# Patient Record
Sex: FEMALE | Race: WHITE
[De-identification: names, ages, dates, MRNs, and addresses within clinical notes are randomized per-mention and may not be internally consistent; named-entity substitution may affect disease eponyms.]

---
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
## 2023-01-05 ENCOUNTER — OFFICE VISIT (OUTPATIENT)
Dept: INTERNAL MEDICINE | Facility: CLINIC | Age: 18
End: 2023-01-05
Payer: COMMERCIAL

## 2023-01-05 ENCOUNTER — LAB (OUTPATIENT)
Dept: LAB | Facility: HOSPITAL | Age: 18
End: 2023-01-05
Payer: COMMERCIAL

## 2023-01-05 VITALS
SYSTOLIC BLOOD PRESSURE: 110 MMHG | WEIGHT: 120 LBS | DIASTOLIC BLOOD PRESSURE: 68 MMHG | RESPIRATION RATE: 16 BRPM | BODY MASS INDEX: 19.29 KG/M2 | TEMPERATURE: 97.2 F | HEART RATE: 80 BPM | HEIGHT: 66 IN | OXYGEN SATURATION: 99 %

## 2023-01-05 DIAGNOSIS — E55.9 VITAMIN D DEFICIENCY: ICD-10-CM

## 2023-01-05 DIAGNOSIS — N92.6 MISSED PERIOD: ICD-10-CM

## 2023-01-05 DIAGNOSIS — F41.9 ANXIETY: ICD-10-CM

## 2023-01-05 DIAGNOSIS — Z00.00 ANNUAL PHYSICAL EXAM: Primary | ICD-10-CM

## 2023-01-05 DIAGNOSIS — Z00.00 ANNUAL PHYSICAL EXAM: ICD-10-CM

## 2023-01-05 LAB
DEPRECATED RDW RBC AUTO: 40.2 FL (ref 37–54)
ERYTHROCYTE [DISTWIDTH] IN BLOOD BY AUTOMATED COUNT: 12.3 % (ref 12.3–15.4)
HCT VFR BLD AUTO: 41.7 % (ref 34–46.6)
HGB BLD-MCNC: 13.9 G/DL (ref 12–15.9)
MCH RBC QN AUTO: 29.8 PG (ref 26.6–33)
MCHC RBC AUTO-ENTMCNC: 33.3 G/DL (ref 31.5–35.7)
MCV RBC AUTO: 89.5 FL (ref 79–97)
PLATELET # BLD AUTO: 181 10*3/MM3 (ref 140–450)
PMV BLD AUTO: 10.8 FL (ref 6–12)
RBC # BLD AUTO: 4.66 10*6/MM3 (ref 3.77–5.28)
WBC NRBC COR # BLD: 5.88 10*3/MM3 (ref 3.4–10.8)

## 2023-01-05 PROCEDURE — 84443 ASSAY THYROID STIM HORMONE: CPT

## 2023-01-05 PROCEDURE — 2014F MENTAL STATUS ASSESS: CPT | Performed by: NURSE PRACTITIONER

## 2023-01-05 PROCEDURE — 80053 COMPREHEN METABOLIC PANEL: CPT

## 2023-01-05 PROCEDURE — 80061 LIPID PANEL: CPT

## 2023-01-05 PROCEDURE — 84702 CHORIONIC GONADOTROPIN TEST: CPT

## 2023-01-05 PROCEDURE — 36415 COLL VENOUS BLD VENIPUNCTURE: CPT

## 2023-01-05 PROCEDURE — 99395 PREV VISIT EST AGE 18-39: CPT | Performed by: NURSE PRACTITIONER

## 2023-01-05 PROCEDURE — 3008F BODY MASS INDEX DOCD: CPT | Performed by: NURSE PRACTITIONER

## 2023-01-05 PROCEDURE — 82306 VITAMIN D 25 HYDROXY: CPT

## 2023-01-05 PROCEDURE — 85027 COMPLETE CBC AUTOMATED: CPT

## 2023-01-05 NOTE — PROGRESS NOTES
Female Physical Note      Date: 2023   Patient Name: Reina Torres  : 2005   MRN: [id number]     Chief Complaint:    Chief Complaint   Patient presents with   • Annual Exam       History of Present Illness: Reina Torres is a 17 y.o. female who is here today for their annual health maintenance and physical.       Subjective      Annual exam  Reports having an IUD.   Denies taking vitamin D supplements due to an issue with her pharmacy.   She also has an issue with getting her birth control from the pharmacy.   Requesting a pregnancy test due to her prescription issues.   She is trying to scheduled an appointment with OBGYN, Dr. Cheryl Wheeler.   Last eye exam was 2 months ago and dental exam was 3 months ago.   She had her braces removed.   Drinks occasionally.   Denies smoking, vaping or illicit drug use.   Ambulates at work for physical activity.   Denies eating a healthy diet.   Recently joined [employer].   Denies any sexual health concern.   Has a family history of cervical cancer, PCOS and endometriosis.     Anxiety  Takes Zoloft 50 mg and tolerates it well.   Requesting refill for Zoloft 50 mg.   Denies little interest in doing things in the last 2 weeks.   She sleeps all the time and unsure if it is due to her medications.     Immunizations  She is due for the covid, flu, and HPV vaccine.       Review of Systems:   Review of Systems   Constitutional: Negative for chills, fatigue and fever.   HENT: Negative for postnasal drip.    Respiratory: Negative for cough, shortness of breath and wheezing.    Cardiovascular: Negative for chest pain.   Gastrointestinal: Negative for abdominal pain, diarrhea, nausea and vomiting.   Endocrine: Negative for cold intolerance and heat intolerance.   Genitourinary: Negative for dysuria.   Musculoskeletal: Negative for arthralgias and myalgias.   Skin: Negative for rash and bruise.   Neurological: Negative for headache.   Hematological: Negative for  adenopathy. Does not bruise/bleed easily.   Psychiatric/Behavioral: Negative for depressed mood.       Past Medical History, Social History, Family History and Care Team were all reviewed with patient and updated as appropriate.     Medications:     Current Outpatient Medications:   •  hydrOXYzine pamoate (Vistaril) 25 MG capsule, Take 1 capsule by mouth At Night As Needed (insomnia)., Disp: 30 capsule, Rfl: 2  •  naproxen (NAPROSYN) 500 MG tablet, Take 1 tablet by mouth Every 12 (Twelve) Hours As Needed for Moderate Pain., Disp: 180 tablet, Rfl: 0  •  sertraline (ZOLOFT) 50 MG tablet, Take 1 tablet by mouth Daily. May be administered without regard to food; administer once daily dosage either in morning or evening., Disp: 90 tablet, Rfl: 3  •  vitamin D (ERGOCALCIFEROL) 1.25 MG (58369 UT) capsule capsule, Take 1 capsule by mouth 1 (One) Time Per Week., Disp: 12 capsule, Rfl: 0  •  Xulane 150-35 MCG/24HR, APPLY 1 PATCH EACH WEEK FOR 3 WEEKS (21 TOTAL DAYS), FOLLOWED BY ONE WEEK PATCH-FREE, Disp: 3 patch, Rfl: 0    Allergies:   No Known Allergies    Immunizations:  Td/Tdap(Booster Q 10 yrs):    Flu (Yearly):    Pneumovax (1 yr after Prevnar):    Macfnfr75 (1 yr after Pneumo):    Colorectal Screening:     Last Completed Colonoscopy     This patient has no relevant Health Maintenance data.         Pap:    Last Completed Pap Smear     This patient has no relevant Health Maintenance data.         Mammogram:    Last Completed Mammogram     This patient has no relevant Health Maintenance data.           CT for Smoker (Age 55-75, 30pk yr):    Bone Density/DEXA:   Hep C ( 6876-3023):      Diet/Physical activity:    Sexual Health:     PHQ-2 Depression Screening  Little interest or pleasure in doing things? 0-->not at all   Feeling down, depressed, or hopeless? 0-->not at all   PHQ-2 Total Score 0        Intimate partner violence: (Screen on initial visit, pregnant women, women with injuries, older adult with injury or  evidence of neglect):  • Violence can be a problem in many people's lives, so I now ask every patient about trauma or abuse they may have experienced in a relationship.  • Stress/Safety - Do you feel safe in your relationship?  • Afraid/Abused - Have you ever been in a relationship where you were threatened, hurt, or afraid?  • Friend/Family - Are your friends aware you have been hurt?  • Emergency Plan - Do you have a safe place to go and the resources you need in an emergency?    Osteoporosis:   • Ost menopausal women < 65 with RF (advancing age, previous fracture, glucocorticoid therapy, parental hip fracture, low body weight, current cigarette smoking, excessive alcohol consumption, rheumatoid arthritis, secondary osteoporosis [hypogonadism/premature menopause, malabsorption, chronic liver disease, IBD]).  • All women 65 or older    Objective     Physical Exam:  Vital Signs:   Vitals:    01/05/23 1053   BP: 110/68   Pulse: 80   Resp: 16   Temp: 97.2 °F (36.2 °C)   SpO2: 99%   Weight: 54.4 kg (120 lb)   Height: 167.6 cm (66\")   PainSc: 0-No pain     Body mass index is 19.37 kg/m².     Physical Exam  Vitals and nursing note reviewed.   Constitutional:       General: She is awake. She is not in acute distress.     Appearance: Normal appearance. She is well-developed and normal weight. She is not ill-appearing or diaphoretic.   HENT:      Head: Normocephalic and atraumatic.      Right Ear: Tympanic membrane, ear canal and external ear normal.      Left Ear: Tympanic membrane, ear canal and external ear normal.      Nose: Nose normal.      Mouth/Throat:      Mouth: Mucous membranes are moist.      Pharynx: Oropharynx is clear. No oropharyngeal exudate or posterior oropharyngeal erythema.   Eyes:      Extraocular Movements: Extraocular movements intact.      Conjunctiva/sclera: Conjunctivae normal.      Pupils: Pupils are equal, round, and reactive to light.   Neck:      Thyroid: No thyroid mass, thyromegaly or  thyroid tenderness.      Vascular: No JVD.   Cardiovascular:      Rate and Rhythm: Normal rate and regular rhythm.      Pulses: Normal pulses.      Heart sounds: Normal heart sounds.     No S3 or S4 sounds.   Pulmonary:      Effort: Pulmonary effort is normal. No respiratory distress.      Breath sounds: Normal breath sounds and air entry.   Abdominal:      General: Abdomen is flat. Bowel sounds are normal.      Palpations: Abdomen is soft.      Tenderness: There is no abdominal tenderness.   Musculoskeletal:         General: No swelling.      Cervical back: Normal range of motion and neck supple.      Right lower leg: No edema.      Left lower leg: No edema.   Lymphadenopathy:      Cervical: No cervical adenopathy.   Skin:     General: Skin is warm and dry.      Capillary Refill: Capillary refill takes less than 2 seconds.   Neurological:      General: No focal deficit present.      Mental Status: She is alert and oriented to person, place, and time. Mental status is at baseline.      Motor: No weakness.   Psychiatric:         Attention and Perception: Attention and perception normal.         Mood and Affect: Mood and affect normal.         Speech: Speech normal.         Behavior: Behavior normal.         Thought Content: Thought content normal.         Judgment: Judgment normal.             Assessment / Plan      Assessment/Plan:   Diagnoses and all orders for this visit:    1. Annual physical exam (Primary)  Comments:  Routine labs will be obtained.   HPV vaccine will be given in the office today  -Advice and education given regarding routine dental evaluations, routine eye exams, reproductive health, cardiovascular risk reduction, sunscreen use, and seatbelt use (generall overall safety). Further recommendations after lab evaluation. Annual wellness evaluations recommended.   -Recommended vaccines, but specifically encouraged HPV vaccine as this protects against cervical cancer  Orders:  -     CBC (No Diff);  Future  -     Comprehensive Metabolic Panel; Future  -     Lipid Panel; Future  -     TSH Rfx On Abnormal To Free T4; Future  -     Vitamin D,25-Hydroxy; Future  -     hCG, Quantitative, Pregnancy; Future  -     HPV Vaccine  -     Ambulatory Referral to Obstetrics / Gynecology    2. Anxiety  Comments:  Zoloft 50 mg will be refilled.     Orders:  -     sertraline (ZOLOFT) 50 MG tablet; Take 1 tablet by mouth Daily. May be administered without regard to food; administer once daily dosage either in morning or evening.  Dispense: 90 tablet; Refill: 3    3. Missed period  -     hCG, Quantitative, Pregnancy; Future    4. Vitamin D deficiency  -     Vitamin D,25-Hydroxy; Future         Follow Up:   Return in about 1 year (around 1/5/2024) for Annual.  -2nd HPV vaccine in 1-2m (Feb/Mar), 3rd HPV vaccine in 6m (July)      Reina Torres voices understanding and acceptance of this advice and will call back with any further questions or concerns. AVS with preventive healthcare tips printed for patient.     ESME Lynch  [facility]     Transcribed from ambient dictation for ESME Moss by Ray Morales.  01/05/23   12:41 EST    Patient or patient representative verbalized consent to the visit recording.  I have personally performed the services described in this document as transcribed by the above individual, and it is both accurate and complete.

## 2023-01-05 NOTE — PROGRESS NOTES
Female Physical Note      Date: 2023   Patient Name: Reina Torres  : 2005   MRN: [id number]     Chief Complaint:    Chief Complaint   Patient presents with   • Annual Exam       History of Present Illness: Reina Torres is a 17 y.o. female who is here today for their annual health maintenance and physical. ***      Subjective      Review of Systems:   Review of Systems    Past Medical History, Social History, Family History and Care Team were all reviewed with patient and updated as appropriate.     Medications:     Current Outpatient Medications:   •  hydrOXYzine pamoate (Vistaril) 25 MG capsule, Take 1 capsule by mouth At Night As Needed (insomnia)., Disp: 30 capsule, Rfl: 2  •  naproxen (NAPROSYN) 500 MG tablet, Take 1 tablet by mouth Every 12 (Twelve) Hours As Needed for Moderate Pain., Disp: 180 tablet, Rfl: 0  •  sertraline (ZOLOFT) 50 MG tablet, Take 1 tablet by mouth Daily. May be administered without regard to food; administer once daily dosage either in morning or evening., Disp: 90 tablet, Rfl: 3  •  vitamin D (ERGOCALCIFEROL) 1.25 MG (56984 UT) capsule capsule, Take 1 capsule by mouth 1 (One) Time Per Week., Disp: 12 capsule, Rfl: 0  •  Xulane 150-35 MCG/24HR, APPLY 1 PATCH EACH WEEK FOR 3 WEEKS (21 TOTAL DAYS), FOLLOWED BY ONE WEEK PATCH-FREE, Disp: 3 patch, Rfl: 0    Allergies:   No Known Allergies    Immunizations:  Td/Tdap(Booster Q 10 yrs):    Flu (Yearly):    Pneumovax (1 yr after Prevnar):    Wizhxyd72 (1 yr after Pneumo):    Colorectal Screening:     Last Completed Colonoscopy     This patient has no relevant Health Maintenance data.         Pap:    Last Completed Pap Smear     This patient has no relevant Health Maintenance data.         Mammogram:    Last Completed Mammogram     This patient has no relevant Health Maintenance data.           CT for Smoker (Age 55-75, 30pk yr):    Bone Density/DEXA:   Hep C ( [address]55):      Diet/Physical activity:    Sexual Health:      PHQ-2 Depression Screening  Little interest or pleasure in doing things?     Feeling down, depressed, or hopeless?     PHQ-2 Total Score          Intimate partner violence: (Screen on initial visit, pregnant women, women with injuries, older adult with injury or evidence of neglect):  • Violence can be a problem in many people's lives, so I now ask every patient about trauma or abuse they may have experienced in a relationship.  • Stress/Safety - Do you feel safe in your relationship?  • Afraid/Abused - Have you ever been in a relationship where you were threatened, hurt, or afraid?  • Friend/Family - Are your friends aware you have been hurt?  • Emergency Plan - Do you have a safe place to go and the resources you need in an emergency?    Osteoporosis:   • Ost menopausal women < 65 with RF (advancing age, previous fracture, glucocorticoid therapy, parental hip fracture, low body weight, current cigarette smoking, excessive alcohol consumption, rheumatoid arthritis, secondary osteoporosis [hypogonadism/premature menopause, malabsorption, chronic liver disease, IBD]).  • All women 65 or older    Objective     Physical Exam:  Vital Signs:   Vitals:    01/05/23 1053   BP: 110/68   Pulse: 80   Resp: 16   Temp: 97.2 °F (36.2 °C)   SpO2: 99%   Weight: 54.4 kg (120 lb)   Height: 167.6 cm (66\")   PainSc: 0-No pain     Body mass index is 19.37 kg/m².     Physical Exam    Procedures    Assessment / Plan      Assessment/Plan:   Diagnoses and all orders for this visit:    1. Annual physical exam (Primary)  -     CBC (No Diff); Future  -     Comprehensive Metabolic Panel; Future  -     Lipid Panel; Future  -     TSH Rfx On Abnormal To Free T4; Future  -     Vitamin D,25-Hydroxy; Future  -     hCG, Quantitative, Pregnancy; Future    2. Anxiety  -     sertraline (ZOLOFT) 50 MG tablet; Take 1 tablet by mouth Daily. May be administered without regard to food; administer once daily dosage either in morning or evening.  Dispense:  90 tablet; Refill: 3    3. Missed period  -     hCG, Quantitative, Pregnancy; Future    4. Vitamin D deficiency  -     Vitamin D,25-Hydroxy; Future         Follow Up:   No follow-ups on file.    Healthcare Maintenance:   Counseling provided on ***.   Reina Torres voices understanding and acceptance of this advice and will call back with any further questions or concerns. AVS with preventive healthcare tips printed for patient.     ESME Lynch  [facility] [place]

## 2023-01-06 LAB
25(OH)D3 SERPL-MCNC: 18 NG/ML (ref 30–100)
ALBUMIN SERPL-MCNC: 4.2 G/DL (ref 3.5–5.2)
ALBUMIN/GLOB SERPL: 1.4 G/DL
ALP SERPL-CCNC: 58 U/L (ref 39–117)
ALT SERPL W P-5'-P-CCNC: 9 U/L (ref 1–33)
ANION GAP SERPL CALCULATED.3IONS-SCNC: 7.4 MMOL/L (ref 5–15)
AST SERPL-CCNC: 19 U/L (ref 1–32)
BILIRUB SERPL-MCNC: 0.2 MG/DL (ref 0–1.2)
BUN SERPL-MCNC: 13 MG/DL (ref 6–20)
BUN/CREAT SERPL: 20.3 (ref 7–25)
CALCIUM SPEC-SCNC: 9.1 MG/DL (ref 8.6–10.5)
CHLORIDE SERPL-SCNC: 103 MMOL/L (ref 98–107)
CHOLEST SERPL-MCNC: 157 MG/DL (ref 0–200)
CO2 SERPL-SCNC: 25.6 MMOL/L (ref 22–29)
CREAT SERPL-MCNC: 0.64 MG/DL (ref 0.57–1)
EGFRCR SERPLBLD CKD-EPI 2021: 130.7 ML/MIN/1.73
GLOBULIN UR ELPH-MCNC: 3 GM/DL
GLUCOSE SERPL-MCNC: 78 MG/DL (ref 65–99)
HCG INTACT+B SERPL-ACNC: <1 MIU/ML
HDLC SERPL-MCNC: 70 MG/DL (ref 40–60)
LDLC SERPL CALC-MCNC: 65 MG/DL (ref 0–100)
LDLC/HDLC SERPL: 0.87 {RATIO}
POTASSIUM SERPL-SCNC: 4.5 MMOL/L (ref 3.5–5.2)
PROT SERPL-MCNC: 7.2 G/DL (ref 6–8.5)
SODIUM SERPL-SCNC: 136 MMOL/L (ref 136–145)
TRIGL SERPL-MCNC: 129 MG/DL (ref 0–150)
TSH SERPL DL<=0.05 MIU/L-ACNC: 3.12 UIU/ML (ref 0.27–4.2)
VLDLC SERPL-MCNC: 22 MG/DL (ref 5–40)

## 2023-01-09 ENCOUNTER — TELEPHONE (OUTPATIENT)
Dept: OBSTETRICS AND GYNECOLOGY | Facility: CLINIC | Age: 18
End: 2023-01-09
Payer: COMMERCIAL
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
## 2023-01-18 ENCOUNTER — PATIENT MESSAGE (OUTPATIENT)
Dept: INTERNAL MEDICINE | Facility: CLINIC | Age: 18
End: 2023-01-18
Payer: COMMERCIAL

## 2023-01-28 DIAGNOSIS — Z30.8 ENCOUNTER FOR OTHER CONTRACEPTIVE MANAGEMENT: ICD-10-CM

## 2023-01-29 RX ORDER — NORELGESTROMIN AND ETHINYL ESTRADIOL 150; 35 UG/D; UG/D
PATCH TRANSDERMAL
Qty: 3 PATCH | Refills: 2 | Status: SHIPPED | OUTPATIENT
Start: 2023-01-29

## 2023-02-16 ENCOUNTER — OFFICE VISIT (OUTPATIENT)
Dept: INTERNAL MEDICINE | Facility: CLINIC | Age: 18
End: 2023-02-16
Payer: COMMERCIAL

## 2023-02-16 VITALS
TEMPERATURE: 97.2 F | RESPIRATION RATE: 16 BRPM | HEIGHT: 66 IN | SYSTOLIC BLOOD PRESSURE: 100 MMHG | BODY MASS INDEX: 18.96 KG/M2 | OXYGEN SATURATION: 98 % | HEART RATE: 85 BPM | DIASTOLIC BLOOD PRESSURE: 60 MMHG | WEIGHT: 118 LBS

## 2023-02-16 DIAGNOSIS — N89.8 VAGINAL DISCHARGE: Primary | ICD-10-CM

## 2023-02-16 DIAGNOSIS — R35.0 FREQUENCY OF URINATION: ICD-10-CM

## 2023-02-16 LAB
B-HCG UR QL: NEGATIVE
BILIRUB BLD-MCNC: NEGATIVE MG/DL
CLARITY, POC: CLEAR
COLOR UR: YELLOW
EXPIRATION DATE: NORMAL
GLUCOSE UR STRIP-MCNC: NEGATIVE MG/DL
INTERNAL NEGATIVE CONTROL: NORMAL
INTERNAL POSITIVE CONTROL: NORMAL
KETONES UR QL: NEGATIVE
LEUKOCYTE EST, POC: NEGATIVE
Lab: NORMAL
NITRITE UR-MCNC: NEGATIVE MG/ML
PH UR: 6.5 [PH] (ref 5–8)
PROT UR STRIP-MCNC: NEGATIVE MG/DL
RBC # UR STRIP: NEGATIVE /UL
SP GR UR: 1.01 (ref 1–1.03)
UROBILINOGEN UR QL: NORMAL

## 2023-02-16 PROCEDURE — 81025 URINE PREGNANCY TEST: CPT | Performed by: NURSE PRACTITIONER

## 2023-02-16 PROCEDURE — 87086 URINE CULTURE/COLONY COUNT: CPT | Performed by: NURSE PRACTITIONER

## 2023-02-16 PROCEDURE — 87661 TRICHOMONAS VAGINALIS AMPLIF: CPT | Performed by: NURSE PRACTITIONER

## 2023-02-16 PROCEDURE — 99214 OFFICE O/P EST MOD 30 MIN: CPT | Performed by: NURSE PRACTITIONER

## 2023-02-16 PROCEDURE — 87491 CHLMYD TRACH DNA AMP PROBE: CPT | Performed by: NURSE PRACTITIONER

## 2023-02-16 PROCEDURE — 87591 N.GONORRHOEAE DNA AMP PROB: CPT | Performed by: NURSE PRACTITIONER

## 2023-02-16 PROCEDURE — 87801 DETECT AGNT MULT DNA AMPLI: CPT | Performed by: NURSE PRACTITIONER

## 2023-02-16 PROCEDURE — 87798 DETECT AGENT NOS DNA AMP: CPT | Performed by: NURSE PRACTITIONER

## 2023-02-16 NOTE — PROGRESS NOTES
Follow Up Office Visit      Date: 2023   Patient Name: Reina Torres  : 2005   MRN: [id number]     Chief Complaint:    Chief Complaint   Patient presents with   • Vaginal Discharge       History of Present Illness: Riena Torres is a 17 y.o. female who is here today to follow up. She is concerned for possible BV infection and requests testing. Reports this is a recurring issue and is inquiring about preventative measures moving forward. Her acute symptoms started ~1-1.5 week ago. She first thought she was developing a vaginal yeast infection, and treated with OTC vaginal monistat x1 dose with no relief of symptoms. She is sexually active, in monogomous relationship with her boyfriend. They do not use condoms. Using Xulane patch for contraception. No recent antibiotic use.       Subjective      Review of Systems:   Review of Systems   Constitutional: Negative for chills and fever.   Gastrointestinal: Negative for abdominal pain and nausea.   Genitourinary: Positive for frequency and vaginal discharge (malodorous grey-yellow discharge). Negative for dyspareunia, dysuria, flank pain, hematuria, menstrual problem (periods are regular, cycles q28 days- lasting 5-7 days), pelvic pain, pelvic pressure, urgency, vaginal bleeding and vaginal pain.        +vaginal itching       I have reviewed the patients family history, social history, past medical history, past surgical history and have updated it as appropriate.     Medications:     Current Outpatient Medications:   •  hydrOXYzine pamoate (Vistaril) 25 MG capsule, Take 1 capsule by mouth At Night As Needed (insomnia)., Disp: 30 capsule, Rfl: 2  •  naproxen (NAPROSYN) 500 MG tablet, Take 1 tablet by mouth Every 12 (Twelve) Hours As Needed for Moderate Pain., Disp: 180 tablet, Rfl: 0  •  sertraline (ZOLOFT) 50 MG tablet, Take 1 tablet by mouth Daily. May be administered without regard to food; administer once daily dosage either in morning or evening.,  "Disp: 90 tablet, Rfl: 3  •  vitamin D (ERGOCALCIFEROL) 1.25 MG (56292 UT) capsule capsule, Take 1 capsule by mouth 1 (One) Time Per Week., Disp: 12 capsule, Rfl: 0  •  Xulane 150-35 MCG/24HR, APPLY 1 PATCH EACH WEEK FOR 3 WEEKS (21 TOTAL DAYS), FOLLOWED BY ONE WEEK PATCH-FREE, Disp: 3 patch, Rfl: 2    Allergies:   No Known Allergies    Objective     Physical Exam: Please see above  Vital Signs:   Vitals:    02/16/23 1441   BP: 100/60   Pulse: 85   Resp: 16   Temp: 97.2 °F (36.2 °C)   SpO2: 98%   Weight: 53.5 kg (118 lb)   Height: 167.6 cm (66\")   PainSc: 0-No pain     Body mass index is 19.05 kg/m².    Physical Exam  Vitals and nursing note reviewed.   Constitutional:       General: She is not in acute distress.     Appearance: Normal appearance. She is not ill-appearing.   Pulmonary:      Effort: Pulmonary effort is normal. No respiratory distress.   Skin:     General: Skin is warm and dry.   Neurological:      Mental Status: She is alert and oriented to person, place, and time.   Psychiatric:         Mood and Affect: Mood normal.         Behavior: Behavior normal.         Thought Content: Thought content normal.         Judgment: Judgment normal.           Results:   Imaging:     Labs:    Latest Reference Range & Units 02/16/23 15:23   Color, UA Yellow, Straw, Dark Yellow, Chanell  Yellow   Appearance, UA Clear  Clear   Specific Gravity, UA 1.005 - 1.030  1.015   pH, UA 5.0 - 8.0  6.5   Glucose Negative mg/dL Negative   Ketones, UA Negative  Negative   Blood, UA Negative  Negative   Nitrite, UA Negative  Negative   Leukocytes, UA Negative  Negative   Protein, UA Negative mg/dL Negative   Bilirubin, UA Negative  Negative   Urobilinogen, UA Normal, 0.2 E.U./dL  Normal      Latest Reference Range & Units 02/16/23 15:28   HCG, Urine QL Negative  Negative       Assessment / Plan      Assessment/Plan:   Diagnoses and all orders for this visit:    1. Vaginal discharge (Primary)  -     NuSwab VG+ - Swab, Vagina; Future  -   "   POC Urinalysis Dipstick  -     POCT pregnancy, urine  -     NuSwab VG+ - Swab, Vagina  -     Urine Culture - Urine, Urine, Clean Catch; Future    2. Frequency of urination  -     Urine Culture - Urine, Urine, Clean Catch; Future     -Vaginal NuSwab sent to lab  -Urinalysis and culture ordered   -UA WNL  -Regarding persistent/ recurrent symptoms, vaginal metronidazole can be used twice weekly for 4-6 months, but I recommend lifestyle changes first. We will plan to treat acute infection at this time, as appropriate, depending on lab results. I will defer recurrent gynecologic problems to OBGYN once she establishes care. Discussed that situations that alter the vaginal environment such as douching, poor or excessive hygiene, antibiotics, certain soaps, tobacco, tampons will increase the chances of acquiring the condition.  Avoid use of body wash or soap inside the vagina. Possible correlation with intercourse.   -I will be in touch with results as soon as they become available  -Encouraged her to keep upcoming apt with Dr Wheeler [facility] in April. She can give further advisement for any ongoing gyn issues     Follow Up:   Return if symptoms worsen or fail to improve, for Next scheduled follow up.    ESME Lynch  [facility] [place]

## 2023-02-17 ENCOUNTER — TELEPHONE (OUTPATIENT)
Dept: INTERNAL MEDICINE | Facility: CLINIC | Age: 18
End: 2023-02-17
Payer: COMMERCIAL

## 2023-02-17 NOTE — TELEPHONE ENCOUNTER
----- Message from ESME Dominguez sent at 2/17/2023 10:21 AM EST -----  Urine culture negative. Vaginal swab still pending

## 2023-02-18 LAB — BACTERIA SPEC AEROBE CULT: NO GROWTH

## 2023-02-20 LAB
A VAGINAE DNA VAG QL NAA+PROBE: NORMAL SCORE
BVAB2 DNA VAG QL NAA+PROBE: NORMAL SCORE
C ALBICANS DNA VAG QL NAA+PROBE: NEGATIVE
C GLABRATA DNA VAG QL NAA+PROBE: NEGATIVE
C TRACH DNA VAG QL NAA+PROBE: NEGATIVE
MEGA1 DNA VAG QL NAA+PROBE: NORMAL SCORE
N GONORRHOEA DNA VAG QL NAA+PROBE: NEGATIVE
T VAGINALIS DNA VAG QL NAA+PROBE: NEGATIVE

## 2023-02-24 ENCOUNTER — TELEPHONE (OUTPATIENT)
Dept: INTERNAL MEDICINE | Facility: CLINIC | Age: 18
End: 2023-02-24
Payer: COMMERCIAL

## 2023-02-24 NOTE — TELEPHONE ENCOUNTER
Caller: Reina Torres    Relationship: Self    Best call back number: [phone]     Caller requesting test results: REINA    What test was performed: LABS    When was the test performed: 2/16/23    Where was the test performed: IN OFFICE    Additional notes: PATIENT STATES THAT THEY RECEIVED THE RESULTS THROUGH [facility] BUT IS REQUESTING SOMEONE TO GO OVER THE RESULTS WITH HER AS SHE DOES NOT UNDERSTAND THEM.

## 2023-02-24 NOTE — TELEPHONE ENCOUNTER
Called pt, states still having odor, results show negative. Scheduled appt for next week w/ [facility] for further eval.

## 2023-03-02 ENCOUNTER — PATIENT MESSAGE (OUTPATIENT)
Dept: INTERNAL MEDICINE | Facility: CLINIC | Age: 18
End: 2023-03-02
Payer: COMMERCIAL

## 2023-04-19 ENCOUNTER — OFFICE VISIT (OUTPATIENT)
Dept: GASTROENTEROLOGY | Facility: CLINIC | Age: 18
End: 2023-04-19
Payer: COMMERCIAL

## 2023-04-19 VITALS
BODY MASS INDEX: 20.66 KG/M2 | HEART RATE: 77 BPM | WEIGHT: 128 LBS | TEMPERATURE: 97.8 F | DIASTOLIC BLOOD PRESSURE: 78 MMHG | SYSTOLIC BLOOD PRESSURE: 122 MMHG | OXYGEN SATURATION: 99 %

## 2023-04-19 DIAGNOSIS — K59.09 CHRONIC CONSTIPATION: Primary | ICD-10-CM

## 2023-04-19 DIAGNOSIS — K62.5 RECTAL BLEEDING: ICD-10-CM

## 2023-04-19 NOTE — PROGRESS NOTES
Follow Up      Patient Name: Reina Torres  : 2005   MRN: [id number]     Chief Complaint:    Chief Complaint   Patient presents with   • Follow-up     Blood in stool a few weeks ago   constipation       History of Present Illness: Reina Torres is a 17 y.o. female who is here today for follow up on constipation, rectal bleeding.     Since last visit 2022, labs reveal normal CBC, CMP, CRP, Mg, TSH. Celiac panel negative. Vit D 23.5. Pt states that she has been drinking more water, avoiding dairy products and has cut out most fast food. She is having a small, hard, incomplete BM about two days per week. She continues to endorse straining to evacuate and bleeding after evacuations occasionally. She denies rectal pain or fullness.     Patient denies associated fever, chills, indigestion, nausea, vomiting, diarrhea, hematemesis, dysphagia, hematochezia, melena, weight loss or gain, dysuria, jaundice or bruising.    Subjective      Review of Systems:   Review of Systems   Constitutional: Negative for appetite change, chills, diaphoresis, fatigue, fever, unexpected weight gain and unexpected weight loss.   HENT: Negative for drooling, facial swelling, mouth sores, rhinorrhea, sore throat, tinnitus, trouble swallowing and voice change.    Eyes: Negative.    Respiratory: Negative for cough, chest tightness and shortness of breath.    Cardiovascular: Negative for chest pain.   Gastrointestinal: Positive for abdominal distention, abdominal pain (lower cramping), anal bleeding and constipation. Negative for blood in stool, diarrhea, nausea, vomiting, GERD and indigestion.   Genitourinary: Negative for dysuria, flank pain, hematuria and pelvic pain.   Musculoskeletal: Negative for arthralgias and myalgias.   Skin: Negative for color change, pallor and rash.   Neurological: Negative for dizziness, tremors, syncope, weakness and numbness.   Psychiatric/Behavioral: Negative for hallucinations and sleep disturbance.  The patient is not nervous/anxious.    All other systems reviewed and are negative.      Medications:     Current Outpatient Medications:   •  naproxen (NAPROSYN) 500 MG tablet, Take 1 tablet by mouth Every 12 (Twelve) Hours As Needed for Moderate Pain., Disp: 180 tablet, Rfl: 0  •  sertraline (ZOLOFT) 50 MG tablet, Take 1 tablet by mouth Daily. May be administered without regard to food; administer once daily dosage either in morning or evening., Disp: 90 tablet, Rfl: 3  •  vitamin D (ERGOCALCIFEROL) 1.25 MG (74316 UT) capsule capsule, Take 1 capsule by mouth 1 (One) Time Per Week., Disp: 12 capsule, Rfl: 0  •  Xulane 150-35 MCG/24HR, APPLY 1 PATCH EACH WEEK FOR 3 WEEKS (21 TOTAL DAYS), FOLLOWED BY ONE WEEK PATCH-FREE, Disp: 3 patch, Rfl: 2    Allergies:   No Known Allergies    Social History:   Social History     Socioeconomic History   • Marital status: Single   Tobacco Use   • Smoking status: Never   • Smokeless tobacco: Never   Vaping Use   • Vaping Use: Never used   Substance and Sexual Activity   • Alcohol use: Yes     Comment: Occasionally   • Drug use: Never   • Sexual activity: Yes     Partners: Male     Birth control/protection: Patch        Surgical History:   Past Surgical History:   Procedure Laterality Date   • APPENDECTOMY     • WISDOM TOOTH EXTRACTION          Medical History:   Past Medical History:   Diagnosis Date   • Anxiety    • Depression    • Headache         Objective     Physical Exam:  Vital Signs:   Vitals:    04/19/23 1109   BP: 122/78   Pulse: 77   Temp: 97.8 °F (36.6 °C)   SpO2: 99%   Weight: 58.1 kg (128 lb)     Body mass index is 20.66 kg/m².     Physical Exam  Vitals and nursing note reviewed.   Constitutional:       Appearance: Normal appearance. She is normal weight. She is not ill-appearing or diaphoretic.   HENT:      Head: Normocephalic and atraumatic.      Right Ear: External ear normal.      Left Ear: External ear normal.      Nose: Nose normal.      Mouth/Throat:      Mouth:  Mucous membranes are moist.      Pharynx: Oropharynx is clear.   Eyes:      Conjunctiva/sclera: Conjunctivae normal.      Pupils: Pupils are equal, round, and reactive to light.   Neck:      Thyroid: No thyromegaly.   Cardiovascular:      Rate and Rhythm: Normal rate and regular rhythm.      Pulses: Normal pulses.      Heart sounds: Normal heart sounds.   Pulmonary:      Effort: Pulmonary effort is normal.      Breath sounds: Normal breath sounds.   Abdominal:      General: Abdomen is flat. Bowel sounds are normal. There is no distension.      Tenderness: There is abdominal tenderness (mild, bilateral lower quadrants).      Comments: Large stool burden noted throughout colon   Genitourinary:     Comments: Pt deferred  Musculoskeletal:         General: Normal range of motion.      Cervical back: Normal range of motion and neck supple.   Skin:     General: Skin is warm and dry.      Capillary Refill: Capillary refill takes less than 2 seconds.   Neurological:      General: No focal deficit present.      Mental Status: She is oriented to person, place, and time.   Psychiatric:         Mood and Affect: Mood normal.         Assessment / Plan      Assessment/Plan:   There are no diagnoses linked to this encounter.     Chronic constipation  Rectal bleeding   - pt afforded samples of Trulance 3mg daily, advised to call clinic in 1 week with sx update   - pt advised to continue adequate oral hydration, fiber intake, daily activity as tolerated   - previous labs, imaging reviewed   - schedule for CSY   - follow up in clinic after completion of above studies   - call clinic at any time for questions or new / worsened sx    Follow Up:   Return in about 6 weeks (around 5/31/2023).    Plan of care reviewed with the patient at the conclusion of today's visit.  Education was provided regarding diagnosis, management, and any prescribed or recommended OTC medications.  Patient verbalized understanding of and agreement with management  plan.     NOTE TO PATIENT: The 21st Century Cures Act makes medical notes like these available to patients in the interest of transparency. However, be advised this is a medical document. It is intended as peer to peer communication. It is written in medical language and may contain abbreviations or verbiage that are unfamiliar. It may appear blunt or direct. Medical documents are intended to carry relevant information, facts as evident, and the clinical opinion of the practitioner.     Time Statement:   Discussed plan of care in detail with patient today. Patient verbally understands and agrees. I have spent 30 minutes reviewing available diagnostics, obtaining history, examining the patient, developing a treatment plan, and educating the patient on disease process and plan of care.     Flor Mcneill PA-C   [facility]

## 2023-04-23 DIAGNOSIS — Z30.8 ENCOUNTER FOR OTHER CONTRACEPTIVE MANAGEMENT: ICD-10-CM

## 2023-04-24 RX ORDER — NORELGESTROMIN AND ETHINYL ESTRADIOL 150; 35 UG/D; UG/D
PATCH TRANSDERMAL
Qty: 3 PATCH | Refills: 2 | Status: SHIPPED | OUTPATIENT
Start: 2023-04-24

## 2023-05-16 ENCOUNTER — TELEPHONE (OUTPATIENT)
Dept: INTERNAL MEDICINE | Facility: CLINIC | Age: 18
End: 2023-05-16
Payer: COMMERCIAL

## 2023-05-16 NOTE — TELEPHONE ENCOUNTER
Caller: Reina Torres    Relationship: Self    Best call back number: [phone]    What medication are you requesting: FOR UTI    What are your current symptoms: URGENCY TO URINATE EVERY 5-10MIN BUT TRICKLES OUT, BLOOD IN URINE    How long have you been experiencing symptoms: 2 DAYS    Have you had these symptoms before: [x] Yes  [] No    Have you been treated for these symptoms before:  [] Yes  [x] No    If a prescription is needed, what is your preferred pharmacy and phone number: [id number] - [place], [place] - [address] AT NEXT TO [facility] - [phone]  - [phone]      Additional notes:  SHE IS DRINKING CRANBERRY JUICE AND USED EQUATE FOR URINARY PAIN, PLEASE CALL AND ADVISE

## 2023-05-17 ENCOUNTER — OFFICE VISIT (OUTPATIENT)
Dept: INTERNAL MEDICINE | Facility: CLINIC | Age: 18
End: 2023-05-17
Payer: COMMERCIAL

## 2023-05-17 VITALS
TEMPERATURE: 97.5 F | HEART RATE: 88 BPM | OXYGEN SATURATION: 98 % | HEIGHT: 66 IN | DIASTOLIC BLOOD PRESSURE: 62 MMHG | BODY MASS INDEX: 20.89 KG/M2 | SYSTOLIC BLOOD PRESSURE: 108 MMHG | WEIGHT: 130 LBS | RESPIRATION RATE: 16 BRPM

## 2023-05-17 DIAGNOSIS — R30.0 DYSURIA: ICD-10-CM

## 2023-05-17 DIAGNOSIS — N30.01 ACUTE CYSTITIS WITH HEMATURIA: Primary | ICD-10-CM

## 2023-05-17 DIAGNOSIS — Z00.00 HEALTH CARE MAINTENANCE: ICD-10-CM

## 2023-05-17 LAB
BILIRUB BLD-MCNC: NEGATIVE MG/DL
CLARITY, POC: ABNORMAL
COLOR UR: ABNORMAL
EXPIRATION DATE: ABNORMAL
GLUCOSE UR STRIP-MCNC: NEGATIVE MG/DL
KETONES UR QL: NEGATIVE
LEUKOCYTE EST, POC: ABNORMAL
Lab: ABNORMAL
NITRITE UR-MCNC: NEGATIVE MG/ML
PH UR: 6 [PH] (ref 5–8)
PROT UR STRIP-MCNC: ABNORMAL MG/DL
RBC # UR STRIP: ABNORMAL /UL
SP GR UR: 1.02 (ref 1–1.03)
UROBILINOGEN UR QL: NORMAL

## 2023-05-17 PROCEDURE — 87186 SC STD MICRODIL/AGAR DIL: CPT | Performed by: NURSE PRACTITIONER

## 2023-05-17 PROCEDURE — 87086 URINE CULTURE/COLONY COUNT: CPT | Performed by: NURSE PRACTITIONER

## 2023-05-17 PROCEDURE — 87077 CULTURE AEROBIC IDENTIFY: CPT | Performed by: NURSE PRACTITIONER

## 2023-05-17 RX ORDER — NITROFURANTOIN 25; 75 MG/1; MG/1
100 CAPSULE ORAL 2 TIMES DAILY
Qty: 10 CAPSULE | Refills: 0 | Status: SHIPPED | OUTPATIENT
Start: 2023-05-17 | End: 2023-05-22

## 2023-05-17 NOTE — PROGRESS NOTES
"     Follow Up Office Visit      Date: 2023   Patient Name: Reina Torres  : 2005   MRN: [id number]     Chief Complaint:    Chief Complaint   Patient presents with   • Urinary Tract Infection       History of Present Illness: Reina Torres is a 17 y.o. female who is here today for evaluation of acute  symptoms suspicious of UTI. Sx onset was 3d ago. She c/o urinary urgency, frequency, dysuria, and hematuria. She denies abd pain, nausea, fever, or flank pain. She does endorse chills but notes this is \"normal\" for her. She has been treating at home with cranberry juice and equate/azo with no improvement. She also reports recent intercourse in which she did not void afterwards, but she normally does.      She also inquired about upcoming planned colonoscopy.  She is following with GI specialist for rectal bleeding and chronic constipation.  Office visit was .  Patient states she has been scheduled for follow-up with Dr. Cummings to discuss results, but not scheduled for the procedure.    Apt with OBGYN was missed on -- per pt they told her not to come in bc she was on period at that time, but Reina has not yet r/s'd apt.     Subjective      Review of Systems:   Review of Systems   Constitutional: Negative for chills and fever.   Gastrointestinal: Negative for abdominal pain and nausea.   Genitourinary: Positive for dysuria, frequency, hematuria and urgency. Negative for flank pain.       I have reviewed the patients family history, social history, past medical history, past surgical history and have updated it as appropriate.     Medications:     Current Outpatient Medications:   •  naproxen (NAPROSYN) 500 MG tablet, Take 1 tablet by mouth Every 12 (Twelve) Hours As Needed for Moderate Pain., Disp: 180 tablet, Rfl: 0  •  sertraline (ZOLOFT) 50 MG tablet, Take 1 tablet by mouth Daily. May be administered without regard to food; administer once daily dosage either in morning or evening., Disp: 90 " "tablet, Rfl: 3  •  vitamin D (ERGOCALCIFEROL) 1.25 MG (89378 UT) capsule capsule, Take 1 capsule by mouth 1 (One) Time Per Week., Disp: 12 capsule, Rfl: 0  •  Xulane 150-35 MCG/24HR, APPLY 1 PATCH EACH WEEK FOR 3 WEEKS (21 TOTAL DAYS), FOLLOWED BY ONE WEEK PATCH-FREE, Disp: 3 patch, Rfl: 2  •  nitrofurantoin, macrocrystal-monohydrate, (Macrobid) 100 MG capsule, Take 1 capsule by mouth 2 (Two) Times a Day for 5 days., Disp: 10 capsule, Rfl: 0    Allergies:   No Known Allergies    Objective     Physical Exam: Please see above  Vital Signs:   Vitals:    05/17/23 0937   BP: 108/62   Pulse: 88   Resp: 16   Temp: 97.5 °F (36.4 °C)   SpO2: 98%   Weight: 59 kg (130 lb)   Height: 167.6 cm (66\")   PainSc:   2     Body mass index is 20.98 kg/m².    Physical Exam  Vitals and nursing note reviewed.   Constitutional:       General: She is not in acute distress.     Appearance: Normal appearance. She is normal weight. She is not ill-appearing.   HENT:      Mouth/Throat:      Mouth: Mucous membranes are moist.      Pharynx: Oropharynx is clear.   Cardiovascular:      Rate and Rhythm: Normal rate and regular rhythm.      Heart sounds: Normal heart sounds.   Pulmonary:      Effort: Pulmonary effort is normal. No respiratory distress.      Breath sounds: Normal breath sounds.   Abdominal:      General: Abdomen is flat. Bowel sounds are normal. There is no distension.      Palpations: Abdomen is soft.      Tenderness: There is no abdominal tenderness. There is no right CVA tenderness or left CVA tenderness.   Skin:     General: Skin is warm and dry.   Neurological:      Mental Status: She is alert and oriented to person, place, and time.      Motor: No weakness.   Psychiatric:         Mood and Affect: Mood normal.         Behavior: Behavior normal.         Thought Content: Thought content normal.         Judgment: Judgment normal.           Results:   Imaging:     Labs:       Assessment / Plan      Assessment/Plan:   Diagnoses and all " orders for this visit:    1. Acute cystitis with hematuria (Primary)  -We will begin antibiotic now.  We will contact patient once culture report has resulted, and if indicated change antibiotic.  Otherwise, plan to complete entire course of Macrobid prescribed  -Drink lots of water, void after intercourse, wipe front to back  -Advised that she needs to let me know if symptoms persist or worsen  -     nitrofurantoin, macrocrystal-monohydrate, (Macrobid) 100 MG capsule; Take 1 capsule by mouth 2 (Two) Times a Day for 5 days.  Dispense: 10 capsule; Refill: 0    2. Dysuria  -     POCT urinalysis dipstick: small (1+) leuks, trace protein, and 1+ blood  -Will send urine sample off for culture  -     Urine Culture - Urine, Urine, Clean Catch; Future    3. Health care maintenance  -Reminded patient to call and reschedule appointment with SHAN Coughlin at a time she does not anticipate period   -Discussed that I do not know for certain, but would likely is happening is that the GI office is waiting for insurance authorization to schedule the colonoscopy.  Once approved, they will contact her to schedule.  Given that it has been about a month with no word, recommended that she contact the GI office just to touch base and they can confirm/ further advise on any problems with scheduling procedure        Follow Up:   Return if symptoms worsen or fail to improve, for Next scheduled follow up.    ESME Lynch  [facility] [place]

## 2023-05-19 LAB — BACTERIA SPEC AEROBE CULT: ABNORMAL

## 2023-05-20 ENCOUNTER — TELEPHONE (OUTPATIENT)
Dept: INTERNAL MEDICINE | Facility: CLINIC | Age: 18
End: 2023-05-20
Payer: COMMERCIAL

## 2023-05-20 NOTE — TELEPHONE ENCOUNTER
LM of result below, office number given if any questions      ----- Message from ESME Dominguez sent at 5/19/2023  9:45 PM EDT -----  The antibiotic sent on Wednesday should adequately treat UTI. Be sure to complete entire course as prescribed.

## 2023-06-07 RX ORDER — SODIUM, POTASSIUM,MAG SULFATES 17.5-3.13G
1 SOLUTION, RECONSTITUTED, ORAL ORAL TAKE AS DIRECTED
Qty: 354 ML | Refills: 0 | Status: SHIPPED | OUTPATIENT
Start: 2023-06-07

## 2023-06-16 ENCOUNTER — TELEPHONE (OUTPATIENT)
Dept: GASTROENTEROLOGY | Facility: CLINIC | Age: 18
End: 2023-06-16
Payer: COMMERCIAL

## 2023-06-16 NOTE — TELEPHONE ENCOUNTER
RECEIVED CALL FROM RADIOLOGY DEPARTMENT TO CALL PATIENT FOR PROCEDURE QUESTIONS.  INSTRUCTIONS SENT TO PATIENT OVER P2P-Next.

## 2023-08-16 RX ORDER — SODIUM, POTASSIUM,MAG SULFATES 17.5-3.13G
1 SOLUTION, RECONSTITUTED, ORAL ORAL TAKE AS DIRECTED
Qty: 354 ML | Refills: 0 | Status: SHIPPED | OUTPATIENT
Start: 2023-08-16

## 2023-08-22 ENCOUNTER — TELEPHONE (OUTPATIENT)
Dept: GASTROENTEROLOGY | Facility: CLINIC | Age: 18
End: 2023-08-22
Payer: COMMERCIAL

## 2023-08-22 NOTE — TELEPHONE ENCOUNTER
I spoke with patient. Informed her that I received message from [facility]. I will get Dr Cummings's recommendation and give her a call back.

## 2023-08-22 NOTE — TELEPHONE ENCOUNTER
Hub staff attempted to follow warm transfer process and was unsuccessful     Caller: Reina Torres    Relationship to patient: Self    Best call back number: [phone]  CAN CALL ANY TIME     Patient is needing: PATIENT HAS A COLONOSCOPY SCHEDULED FOR 8- WITH DR. SWANN AND PATIENT STATED THAT SHE HAS NOT HAD A BOWEL MOVEMENT IN 3 DAYS DESPITE TAKING MAGNESIUM CITRATE (WHOLE BOTTLE OF LIQUID WHICH IS ABOUT 10OZ.) PATIENT REQUESTING CALL BACK FROM CLINICAL TO DISCUSS WHAT HER NEXT STEPS SHOULD BE AS SHE WANTS TO HAVE THIS COLONOSCOPY.     PATIENT STATED THAT SHE HAS HAD ISSUES IN THE PAST WITH HAVING BOWEL MOVEMENTS BEFORE A SCHEDULED COLONOSCOPY. THIS HAS HAPPENED BEFORE ON 6- AND SHE HAD TO GO TO THE ER DUE TO HER COLON BEING FULL.

## 2023-08-23 ENCOUNTER — OUTSIDE FACILITY SERVICE (OUTPATIENT)
Dept: GASTROENTEROLOGY | Facility: CLINIC | Age: 18
End: 2023-08-23
Payer: COMMERCIAL

## 2023-08-23 PROCEDURE — 45378 DIAGNOSTIC COLONOSCOPY: CPT | Performed by: INTERNAL MEDICINE

## 2023-08-23 NOTE — TELEPHONE ENCOUNTER
I TRIED TO CALL PATIENT BACK. NO ANSWER; LEFT VOICE MESSAGE. DR SWANN WANTED TO MAKER SURE PATIENT GOT GOLYTELY PREP.

## 2023-10-06 DIAGNOSIS — Z30.8 ENCOUNTER FOR OTHER CONTRACEPTIVE MANAGEMENT: ICD-10-CM

## 2023-10-06 RX ORDER — NORELGESTROMIN AND ETHINYL ESTRADIOL 150; 35 UG/D; UG/D
PATCH TRANSDERMAL
Qty: 3 PATCH | Refills: 2 | Status: SHIPPED | OUTPATIENT
Start: 2023-10-06

## 2023-10-18 ENCOUNTER — HOSPITAL ENCOUNTER (EMERGENCY)
Age: 18
Discharge: HOME | End: 2023-10-18
Payer: COMMERCIAL

## 2023-10-18 VITALS
OXYGEN SATURATION: 99 % | TEMPERATURE: 99.14 F | SYSTOLIC BLOOD PRESSURE: 112 MMHG | DIASTOLIC BLOOD PRESSURE: 68 MMHG | HEART RATE: 94 BPM | RESPIRATION RATE: 18 BRPM

## 2023-10-18 VITALS
DIASTOLIC BLOOD PRESSURE: 68 MMHG | RESPIRATION RATE: 18 BRPM | TEMPERATURE: 99.14 F | OXYGEN SATURATION: 99 % | SYSTOLIC BLOOD PRESSURE: 112 MMHG | HEART RATE: 94 BPM

## 2023-10-18 VITALS — BODY MASS INDEX: 24 KG/M2

## 2023-10-18 DIAGNOSIS — J10.1: Primary | ICD-10-CM

## 2023-10-18 DIAGNOSIS — R11.2: ICD-10-CM

## 2023-10-18 PROCEDURE — 87804 INFLUENZA ASSAY W/OPTIC: CPT

## 2023-10-18 PROCEDURE — 99212 OFFICE O/P EST SF 10 MIN: CPT

## 2023-10-18 PROCEDURE — 99214 OFFICE O/P EST MOD 30 MIN: CPT

## 2023-10-18 PROCEDURE — G0463 HOSPITAL OUTPT CLINIC VISIT: HCPCS

## 2023-10-18 PROCEDURE — 87880 STREP A ASSAY W/OPTIC: CPT

## 2023-11-10 DIAGNOSIS — Z30.8 ENCOUNTER FOR OTHER CONTRACEPTIVE MANAGEMENT: ICD-10-CM

## 2023-11-10 RX ORDER — NORELGESTROMIN AND ETHINYL ESTRADIOL 150; 35 UG/D; UG/D
PATCH TRANSDERMAL
Qty: 3 PATCH | Refills: 2 | OUTPATIENT
Start: 2023-11-10

## 2024-01-09 DIAGNOSIS — Z30.8 ENCOUNTER FOR OTHER CONTRACEPTIVE MANAGEMENT: ICD-10-CM

## 2024-01-09 RX ORDER — NORELGESTROMIN AND ETHINYL ESTRADIOL 150; 35 UG/D; UG/D
PATCH TRANSDERMAL
Qty: 3 PATCH | Refills: 2 | OUTPATIENT
Start: 2024-01-09

## 2024-01-10 NOTE — TELEPHONE ENCOUNTER
I referred pt to obgyn in 1/2023, then reminded to call and reschedule missed apt in 5/2023. I still recommend she establish with obgyn for routine gynecologic care/ maintenance. Rx denied. We can discuss this further at her upcoming physical on Thursday 1/11.

## 2024-01-11 ENCOUNTER — OFFICE VISIT (OUTPATIENT)
Dept: INTERNAL MEDICINE | Facility: CLINIC | Age: 19
End: 2024-01-11
Payer: COMMERCIAL

## 2024-01-11 ENCOUNTER — LAB (OUTPATIENT)
Dept: LAB | Facility: HOSPITAL | Age: 19
End: 2024-01-11
Payer: COMMERCIAL

## 2024-01-11 VITALS
RESPIRATION RATE: 16 BRPM | BODY MASS INDEX: 21.69 KG/M2 | HEIGHT: 66 IN | OXYGEN SATURATION: 96 % | SYSTOLIC BLOOD PRESSURE: 102 MMHG | HEART RATE: 90 BPM | TEMPERATURE: 98.1 F | WEIGHT: 135 LBS | DIASTOLIC BLOOD PRESSURE: 68 MMHG

## 2024-01-11 DIAGNOSIS — R04.0 EPISTAXIS: ICD-10-CM

## 2024-01-11 DIAGNOSIS — R51.9 NONINTRACTABLE HEADACHE, UNSPECIFIED CHRONICITY PATTERN, UNSPECIFIED HEADACHE TYPE: ICD-10-CM

## 2024-01-11 DIAGNOSIS — Z00.00 ANNUAL PHYSICAL EXAM: Primary | ICD-10-CM

## 2024-01-11 DIAGNOSIS — Z00.00 HEALTHCARE MAINTENANCE: ICD-10-CM

## 2024-01-11 DIAGNOSIS — Z30.8 ENCOUNTER FOR OTHER CONTRACEPTIVE MANAGEMENT: ICD-10-CM

## 2024-01-11 DIAGNOSIS — R53.83 OTHER FATIGUE: ICD-10-CM

## 2024-01-11 DIAGNOSIS — R06.83 SNORING: ICD-10-CM

## 2024-01-11 LAB
APTT PPP: 28.1 SECONDS (ref 22–39)
BASOPHILS # BLD AUTO: 0.02 10*3/MM3 (ref 0–0.2)
BASOPHILS NFR BLD AUTO: 0.6 % (ref 0–1.5)
DEPRECATED RDW RBC AUTO: 38.6 FL (ref 37–54)
EOSINOPHIL # BLD AUTO: 0.08 10*3/MM3 (ref 0–0.4)
EOSINOPHIL NFR BLD AUTO: 2.6 % (ref 0.3–6.2)
ERYTHROCYTE [DISTWIDTH] IN BLOOD BY AUTOMATED COUNT: 12.5 % (ref 12.3–15.4)
HBA1C MFR BLD: 5.3 % (ref 4.8–5.6)
HCT VFR BLD AUTO: 43.5 % (ref 34–46.6)
HGB BLD-MCNC: 14.3 G/DL (ref 12–15.9)
IMM GRANULOCYTES # BLD AUTO: 0.01 10*3/MM3 (ref 0–0.05)
IMM GRANULOCYTES NFR BLD AUTO: 0.3 % (ref 0–0.5)
INR PPP: 0.9 (ref 0.89–1.12)
LYMPHOCYTES # BLD AUTO: 1.24 10*3/MM3 (ref 0.7–3.1)
LYMPHOCYTES NFR BLD AUTO: 39.7 % (ref 19.6–45.3)
MCH RBC QN AUTO: 28.1 PG (ref 26.6–33)
MCHC RBC AUTO-ENTMCNC: 32.9 G/DL (ref 31.5–35.7)
MCV RBC AUTO: 85.6 FL (ref 79–97)
MONOCYTES # BLD AUTO: 0.37 10*3/MM3 (ref 0.1–0.9)
MONOCYTES NFR BLD AUTO: 11.9 % (ref 5–12)
NEUTROPHILS NFR BLD AUTO: 1.4 10*3/MM3 (ref 1.7–7)
NEUTROPHILS NFR BLD AUTO: 44.9 % (ref 42.7–76)
NRBC BLD AUTO-RTO: 0 /100 WBC (ref 0–0.2)
PLATELET # BLD AUTO: 178 10*3/MM3 (ref 140–450)
PMV BLD AUTO: 10.6 FL (ref 6–12)
PROTHROMBIN TIME: 12.3 SECONDS (ref 12.2–14.5)
RBC # BLD AUTO: 5.08 10*6/MM3 (ref 3.77–5.28)
WBC NRBC COR # BLD AUTO: 3.12 10*3/MM3 (ref 3.4–10.8)

## 2024-01-11 PROCEDURE — 82746 ASSAY OF FOLIC ACID SERUM: CPT

## 2024-01-11 PROCEDURE — 83036 HEMOGLOBIN GLYCOSYLATED A1C: CPT

## 2024-01-11 PROCEDURE — 84466 ASSAY OF TRANSFERRIN: CPT

## 2024-01-11 PROCEDURE — 80053 COMPREHEN METABOLIC PANEL: CPT

## 2024-01-11 PROCEDURE — 83540 ASSAY OF IRON: CPT

## 2024-01-11 PROCEDURE — 82728 ASSAY OF FERRITIN: CPT

## 2024-01-11 PROCEDURE — 85610 PROTHROMBIN TIME: CPT

## 2024-01-11 PROCEDURE — 36415 COLL VENOUS BLD VENIPUNCTURE: CPT

## 2024-01-11 PROCEDURE — 82306 VITAMIN D 25 HYDROXY: CPT

## 2024-01-11 PROCEDURE — 85730 THROMBOPLASTIN TIME PARTIAL: CPT | Performed by: NURSE PRACTITIONER

## 2024-01-11 PROCEDURE — 82607 VITAMIN B-12: CPT

## 2024-01-11 PROCEDURE — 80061 LIPID PANEL: CPT

## 2024-01-11 PROCEDURE — 84443 ASSAY THYROID STIM HORMONE: CPT

## 2024-01-11 PROCEDURE — 85025 COMPLETE CBC W/AUTO DIFF WBC: CPT

## 2024-01-11 RX ORDER — NORELGESTROMIN AND ETHINYL ESTRADIOL 150; 35 UG/D; UG/D
PATCH TRANSDERMAL
Qty: 3 PATCH | Refills: 3 | Status: SHIPPED | OUTPATIENT
Start: 2024-01-11

## 2024-01-11 NOTE — TELEPHONE ENCOUNTER
SPOKE TO CINDY, RELAYED TIFFANI'S MESSAGE TO HER, SHE VERBALIZED UNDERSTANDING AND WILL DISCUSS WITH TIFFANI AT HER PHYSICAL THIS MORNING.

## 2024-01-11 NOTE — PROGRESS NOTES
Female Physical Note      Date: 2024   Patient Name: Reina Torres  : 2005   MRN: [id number]     Chief Complaint   Patient presents with    Annual Exam     Pt states been having headaches and nose bleeds last 6 months       History of Present Illness:  Reina Torres is a 18 y.o. female who is here today for her annual health maintenance exam and physical.     She is not currently fasting. Only had water and bread this AM.     Epistaxis  Patient endorses intermittent epistaxis for the last 6 months.   Reports having a headache before symptoms start.   Her blood pressure is normal today.   Denies prior issues.   Symptoms occur once every other week and bleeding lasts for 5 to 10 minutes.  Denies ever going to the emergency room.   Denies trauma or alcohol use  Denies recent/ freq   NSAID use 1-2 times monthly, usually during period  Brother has had epistaxis since he was a child.   Her grandfather had clotting issues, and her grandmother had a history of DVT.    Allergies  She has allergies but denies taking medication.   Patient used to take Zyrtec when she was younger.    Denies any triggers like mold around her home.     GYN health maintenance  Patient saw a gynecologist, but they will not perform a pap smear until she turns 19-years-old.   Reports taking naproxen once or twice a month when she is menstruating.   She is still using the patch for birth control. Needs RF  Patient plans to have kids in the future.     Anxiety  Patient discontinued Zoloft about 1 month ago.   She ran out of the prescription and never refilled it.   Reports feeling fine without it.   No problems d/c SSRI    Fatigue  She is constantly fatigued.   Reports getting 8 hours of sleep but will take afternoon naps and still wake up tired.   This has been going on for a while.   Patient thought it was due to stress, but her stress has improved.   Denies feeling well rested when she wakes up.   She cut back on her sleep, but  it did not help.   Her fiancé tells her that she snores heavily.   Denies feeling like she is in a deep sleep.    Constipation  Patient had a colonoscopy on 08/23/2023 with Dr. Cummings.   She was told she may have Hirschsprung's disease but never got tested.  The original appointment was rescheduled until the patient had a bowel movement.   Ingesting castor oil was effective.   Patient has made changes to her diet and takes medicine daily, but her bowels are irregular.    She exercises regularly.   Denies using tobacco products, vaping, electronic cigarettes, alcohol, or illicit drug use.   Denies any lower extremity edema.   Her last eye and dental exam were within the last 6 months.    Family history  Her sister has a history of PCOS.       Subjective      Review of Systems:   Review of Systems   Constitutional:  Positive for fatigue.   HENT:          Epistaxis   Gastrointestinal:  Positive for constipation.   Neurological:  Positive for headache.       Past Medical History:   Diagnosis Date    Anxiety     Depression     Headache      Past Surgical History:   Procedure Laterality Date    APPENDECTOMY      WISDOM TOOTH EXTRACTION       Family History   Problem Relation Age of Onset    Hypertension Mother     Hyperlipidemia Mother     Mental illness Mother     Anxiety disorder Mother     Depression Mother     COPD Mother     Hypertension Father     Hyperlipidemia Father     Colon cancer Neg Hx     Colon polyps Neg Hx     Esophageal cancer Neg Hx        Current Outpatient Medications:     linaclotide (Linzess) 290 MCG capsule capsule, Take 1 capsule by mouth Every Morning Before Breakfast., Disp: 90 capsule, Rfl: 3    naproxen (NAPROSYN) 500 MG tablet, Take 1 tablet by mouth Every 12 (Twelve) Hours As Needed for Moderate Pain., Disp: 180 tablet, Rfl: 0    Xulane 150-35 MCG/24HR, APPLY 1 PATCH EACH WEEK FOR 3 WEEKS (21 TOTAL DAYS), FOLLOWED BY ONE WEEK PATCH-FREE, Disp: 3 patch, Rfl: 3  No Known  Allergies  Immunization History   Administered Date(s) Administered    DTaP, Unspecified 03/24/2004, 07/26/2004, 10/15/2004, 08/10/2007    HPV Quadrivalent 08/03/2017, 09/12/2017    Hep A, 2 Dose 09/12/2018    Hep B, Adolescent or Pediatric 2003, 03/24/2004, 07/26/2004    HiB 03/24/2004, 07/26/2004, 10/15/2004    IPV 03/24/2004, 07/26/2004, 10/15/2004, 08/15/2007    MMR 07/26/2004, 08/10/2007    Meningococcal Conjugate 07/15/2015    Tdap 07/15/2015    Varicella 07/26/2004, 07/28/2015     Health Maintenance Summary            Overdue - HPV VACCINES (3 - 2-dose series) Overdue since 2/3/2018      09/12/2017  Imm Admin: HPV Quadrivalent    08/03/2017  Imm Admin: HPV Quadrivalent              Postponed - INFLUENZA VACCINE (Yearly - August to March) Postponed until 3/31/2024      No completion history exists for this topic.              Postponed - COVID-19 Vaccine (1) Postponed until 2/13/2025      No completion history exists for this topic.              CHLAMYDIA SCREENING (Yearly) Next due on 2/16/2024 02/16/2023  Chlamydia trachomatis, DEEPTI component of NuSwab VG+ - Swab, Vagina    02/17/2022  Chlamydia trachomatis, DEEPTI component of Chlamydia trachomatis, Neisseria gonorrhoeae, Trichomonas vaginalis, PCR - Urine, Urine, Clean Catch    12/17/2021  Chlamydia trachomatis, DEEPTI component of Chlamydia trachomatis, Neisseria gonorrhoeae, PCR - , Urine, Clean Catch    09/22/2021  Chlamydia trachomatis, DEEPTI component of NuSwab VG+ - Swab, Vagina    09/14/2021  Chlamydia trachomatis, DEEPTI component of Chlamydia trachomatis, Neisseria gonorrhoeae, PCR - , Urine, Clean Catch    Only the first 5 history entries have been loaded, but more history exists.              ANNUAL PHYSICAL (Yearly) Next due on 1/11/2025 01/11/2024  Done    01/05/2023  Done              TDAP/TD VACCINES (2 - Td or Tdap) Next due on 7/15/2025      07/15/2015  Imm Admin: Tdap              MENINGOCOCCAL VACCINE (Series Information) Aged Out  "     07/15/2015  Imm Admin: Meningococcal Conjugate              HEPATITIS C SCREENING  Completed      06/29/2022  HEPATITIS C ANTIBODY    03/23/2021  HEPATITIS C ANTIBODY              Pneumococcal Vaccine 0-64 (Series Information) Aged Out      No completion history exists for this topic.                      PHQ-2/PHQ-9 Depression Screening:  PHQ Total Score:        Intimate partner violence: (Screen on initial visit, pregnant women, women with injuries, older adult with injury or evidence of neglect):  Violence can be a problem in many people's lives, so I now ask every patient about trauma or abuse they may have experienced in a relationship.  Stress/Safety - Do you feel safe in your relationship?  Afraid/Abused - Have you ever been in a relationship where you were threatened, hurt, or afraid?  Friend/Family - Are your friends aware you have been hurt?  Emergency Plan - Do you have a safe place to go and the resources you need in an emergency?    Osteoporosis:   Ost menopausal women < 65 with RF (advancing age, previous fracture, glucocorticoid therapy, parental hip fracture, low body weight, current cigarette smoking, excessive alcohol consumption, rheumatoid arthritis, secondary osteoporosis [hypogonadism/premature menopause, malabsorption, chronic liver disease, IBD]).  All women 65 or older    Objective     Vitals:    01/11/24 1125   BP: 102/68   Pulse: 90   Resp: 16   Temp: 98.1 °F (36.7 °C)   SpO2: 96%   Weight: 61.2 kg (135 lb)   Height: 167.6 cm (66\")   PainSc: 0-No pain     Body mass index is 21.79 kg/m².     Physical Exam  Vitals and nursing note reviewed.   Constitutional:       General: She is awake. She is not in acute distress.     Appearance: Normal appearance. She is well-developed and normal weight. She is not ill-appearing or diaphoretic.   HENT:      Head: Normocephalic and atraumatic.      Right Ear: Tympanic membrane, ear canal and external ear normal.      Left Ear: Tympanic membrane, ear " canal and external ear normal.      Nose: Nose normal. No signs of injury or nasal tenderness.      Right Nostril: No epistaxis.      Left Nostril: No epistaxis.      Right Turbinates: Not swollen.      Left Turbinates: Not swollen.      Right Sinus: No maxillary sinus tenderness or frontal sinus tenderness.      Left Sinus: No maxillary sinus tenderness or frontal sinus tenderness.      Mouth/Throat:      Mouth: Mucous membranes are moist.      Pharynx: Oropharynx is clear. No oropharyngeal exudate or posterior oropharyngeal erythema.   Eyes:      Extraocular Movements: Extraocular movements intact.      Conjunctiva/sclera: Conjunctivae normal.      Pupils: Pupils are equal, round, and reactive to light.   Neck:      Thyroid: No thyroid mass, thyromegaly or thyroid tenderness.      Vascular: No carotid bruit or JVD.   Cardiovascular:      Rate and Rhythm: Normal rate and regular rhythm.      Pulses: Normal pulses.      Heart sounds: Normal heart sounds.      No S3 or S4 sounds.   Pulmonary:      Effort: Pulmonary effort is normal. No respiratory distress.      Breath sounds: Normal breath sounds and air entry.   Abdominal:      General: Abdomen is flat. Bowel sounds are normal. There is no distension.      Palpations: Abdomen is soft. There is no hepatomegaly or splenomegaly.      Tenderness: There is no abdominal tenderness. There is no guarding or rebound.   Musculoskeletal:         General: No swelling.      Cervical back: Normal range of motion and neck supple.      Right lower leg: No edema.      Left lower leg: No edema.   Lymphadenopathy:      Cervical: No cervical adenopathy.   Skin:     General: Skin is warm and dry.      Capillary Refill: Capillary refill takes less than 2 seconds.   Neurological:      General: No focal deficit present.      Mental Status: She is alert and oriented to person, place, and time. Mental status is at baseline.      Cranial Nerves: No cranial nerve deficit.      Sensory: No  sensory deficit.      Motor: No weakness.      Coordination: Coordination normal.      Gait: Gait normal.      Deep Tendon Reflexes: Reflexes normal.   Psychiatric:         Attention and Perception: Attention and perception normal.         Mood and Affect: Mood and affect normal.         Speech: Speech normal.         Behavior: Behavior normal.         Thought Content: Thought content normal.         Judgment: Judgment normal.             Assessment / Plan      Assessment/Plan:   Diagnoses and all orders for this visit:    1. Annual physical exam (Primary)  -Patient denies fever, chills, headache, ear pain, sore throat, shortness of air, cough, wheezing, chest pain, palpitations, abdominal pain, nausea, vomiting, diarrhea, dysuria, blood in stool or urine. Mood is stable. Eating and drinking as usual. No unexplained weight loss or gain.    -Patient to call and reschedule well woman exam this July when she turns 19.  I will refill birth control until that time then defer to specialist    2. Other fatigue  -     Cancel: CBC (No Diff); Future  -     Vitamin B12; Future  -     Folate; Future  -     Vitamin D 25 hydroxy; Future  -     Iron Profile; Future  -     Ferritin; Future  -     TSH Rfx On Abnormal To Free T4; Future  -     Ambulatory Referral to Sleep Medicine  -     CBC w AUTO Differential; Future    3. Snoring  -     Ambulatory Referral to Sleep Medicine    4. Nonintractable headache, unspecified chronicity pattern, unspecified headache type  -     Ambulatory Referral to Sleep Medicine    5. Healthcare maintenance  -     Comprehensive Metabolic Panel; Future  -     Hemoglobin A1c; Future  -     Lipid Panel; Future    6. Encounter for other contraceptive management  -     Xulane 150-35 MCG/24HR; APPLY 1 PATCH EACH WEEK FOR 3 WEEKS (21 TOTAL DAYS), FOLLOWED BY ONE WEEK PATCH-FREE  Dispense: 3 patch; Refill: 3    7. Epistaxis  -Recommended initiating trial of allergy regimen to see if there is any improvement in  nosebleeds: oral antihistamine, nasal steroid spray, and Ayr (saline spray or gel) applied to nares daily.  Use humidifier on nightstand while sleeping.  -Concerning that headaches occur during nosebleed episodes.  Return to your eye doctor and have eye exam.  -     CBC w AUTO Differential; Future  -     Protime-INR; Future  -     aPTT         Follow Up:   Return in about 4 weeks (around 2/8/2024) for Recheck.  -Recheck nosebleeds, headache, fatigue    I spent approximately 65 minutes providing clinical care for this patient; including review of patient's chart and provider documentation, face to face time spent with patient in examination room (obtaining history, performing physical exam, discussing diagnosis and management options), placing orders, and completing patient documentation.    ESME Lynch  [facility]     Transcribed from ambient dictation for ESME Moss by Ray Morales.  01/11/24   13:02 EST    Patient or patient representative verbalized consent to the visit recording.  I have personally performed the services described in this document as transcribed by the above individual, and it is both accurate and complete.

## 2024-01-12 LAB
25(OH)D3 SERPL-MCNC: 33 NG/ML (ref 30–100)
ALBUMIN SERPL-MCNC: 4.2 G/DL (ref 3.5–5.2)
ALBUMIN/GLOB SERPL: 1.3 G/DL
ALP SERPL-CCNC: 73 U/L (ref 39–117)
ALT SERPL W P-5'-P-CCNC: 13 U/L (ref 1–33)
ANION GAP SERPL CALCULATED.3IONS-SCNC: 10.6 MMOL/L (ref 5–15)
AST SERPL-CCNC: 22 U/L (ref 1–32)
BILIRUB SERPL-MCNC: <0.2 MG/DL (ref 0–1.2)
BUN SERPL-MCNC: 11 MG/DL (ref 6–20)
BUN/CREAT SERPL: 17.5 (ref 7–25)
CALCIUM SPEC-SCNC: 9.2 MG/DL (ref 8.6–10.5)
CHLORIDE SERPL-SCNC: 104 MMOL/L (ref 98–107)
CHOLEST SERPL-MCNC: 142 MG/DL (ref 0–200)
CO2 SERPL-SCNC: 23.4 MMOL/L (ref 22–29)
CREAT SERPL-MCNC: 0.63 MG/DL (ref 0.57–1)
EGFRCR SERPLBLD CKD-EPI 2021: 130.4 ML/MIN/1.73
FERRITIN SERPL-MCNC: 44.7 NG/ML (ref 13–150)
FOLATE SERPL-MCNC: 15.2 NG/ML (ref 4.78–24.2)
GLOBULIN UR ELPH-MCNC: 3.2 GM/DL
GLUCOSE SERPL-MCNC: 71 MG/DL (ref 65–99)
HDLC SERPL-MCNC: 69 MG/DL (ref 40–60)
IRON 24H UR-MRATE: 45 MCG/DL (ref 37–145)
IRON SATN MFR SERPL: 9 % (ref 20–50)
LDLC SERPL CALC-MCNC: 46 MG/DL (ref 0–100)
LDLC/HDLC SERPL: 0.58 {RATIO}
POTASSIUM SERPL-SCNC: 4.6 MMOL/L (ref 3.5–5.2)
PROT SERPL-MCNC: 7.4 G/DL (ref 6–8.5)
SODIUM SERPL-SCNC: 138 MMOL/L (ref 136–145)
TIBC SERPL-MCNC: 498 MCG/DL (ref 298–536)
TRANSFERRIN SERPL-MCNC: 334 MG/DL (ref 200–360)
TRIGL SERPL-MCNC: 164 MG/DL (ref 0–150)
TSH SERPL DL<=0.05 MIU/L-ACNC: 1.29 UIU/ML (ref 0.27–4.2)
VIT B12 BLD-MCNC: 259 PG/ML (ref 211–946)
VLDLC SERPL-MCNC: 27 MG/DL (ref 5–40)

## 2024-02-06 DIAGNOSIS — Z30.8 ENCOUNTER FOR OTHER CONTRACEPTIVE MANAGEMENT: ICD-10-CM

## 2024-02-06 RX ORDER — NORELGESTROMIN AND ETHINYL ESTRADIOL 150; 35 UG/D; UG/D
PATCH TRANSDERMAL
Qty: 3 PATCH | Refills: 3 | OUTPATIENT
Start: 2024-02-06

## 2024-02-07 ENCOUNTER — LAB (OUTPATIENT)
Dept: LAB | Facility: HOSPITAL | Age: 19
End: 2024-02-07
Payer: COMMERCIAL

## 2024-02-07 ENCOUNTER — OFFICE VISIT (OUTPATIENT)
Dept: INTERNAL MEDICINE | Facility: CLINIC | Age: 19
End: 2024-02-07
Payer: COMMERCIAL

## 2024-02-07 VITALS
DIASTOLIC BLOOD PRESSURE: 68 MMHG | BODY MASS INDEX: 21.21 KG/M2 | RESPIRATION RATE: 16 BRPM | HEART RATE: 90 BPM | WEIGHT: 132 LBS | SYSTOLIC BLOOD PRESSURE: 102 MMHG | HEIGHT: 66 IN | OXYGEN SATURATION: 98 %

## 2024-02-07 DIAGNOSIS — R53.83 OTHER FATIGUE: ICD-10-CM

## 2024-02-07 DIAGNOSIS — R04.0 EPISTAXIS: ICD-10-CM

## 2024-02-07 DIAGNOSIS — E61.1 IRON DEFICIENCY: ICD-10-CM

## 2024-02-07 DIAGNOSIS — H92.09 OTALGIA, UNSPECIFIED LATERALITY: ICD-10-CM

## 2024-02-07 DIAGNOSIS — R79.89 LOW SERUM VITAMIN D: ICD-10-CM

## 2024-02-07 DIAGNOSIS — H92.01 DISCOMFORT OF RIGHT EAR: ICD-10-CM

## 2024-02-07 DIAGNOSIS — D70.9 NEUTROPENIA, UNSPECIFIED TYPE: ICD-10-CM

## 2024-02-07 DIAGNOSIS — E78.2 MIXED HYPERLIPIDEMIA: ICD-10-CM

## 2024-02-07 DIAGNOSIS — R79.89 LOW VITAMIN B12 LEVEL: ICD-10-CM

## 2024-02-07 DIAGNOSIS — R51.9 NONINTRACTABLE HEADACHE, UNSPECIFIED CHRONICITY PATTERN, UNSPECIFIED HEADACHE TYPE: ICD-10-CM

## 2024-02-07 DIAGNOSIS — D70.9 NEUTROPENIA, UNSPECIFIED TYPE: Primary | ICD-10-CM

## 2024-02-07 LAB
IRON 24H UR-MRATE: 160 MCG/DL (ref 37–145)
IRON SATN MFR SERPL: 27 % (ref 20–50)
TIBC SERPL-MCNC: 589 MCG/DL (ref 298–536)
TRANSFERRIN SERPL-MCNC: 395 MG/DL (ref 200–360)

## 2024-02-07 PROCEDURE — 36415 COLL VENOUS BLD VENIPUNCTURE: CPT

## 2024-02-07 PROCEDURE — 83540 ASSAY OF IRON: CPT

## 2024-02-07 PROCEDURE — 84466 ASSAY OF TRANSFERRIN: CPT

## 2024-02-07 PROCEDURE — 99214 OFFICE O/P EST MOD 30 MIN: CPT | Performed by: NURSE PRACTITIONER

## 2024-02-07 PROCEDURE — 85025 COMPLETE CBC W/AUTO DIFF WBC: CPT

## 2024-02-07 NOTE — PROGRESS NOTES
Follow Up Office Visit      Date: 2024   Patient Name: Reina Torres  : 2005   MRN: [id number]     Chief Complaint:    Chief Complaint   Patient presents with    Headache     Follow up, 4 week    Fatigue    Nose Bleed       History of Present Illness: Reina Torres is a 18 y.o. female who is here today for 4w follow up and recheck on nosebleeds, HA, and fatigue. Overall she reports doing well, feeling better, and has no acute complaints at this time.     The patient consented to the use of PEÑA.     Healthcare maintenance.   -Patient had blood work done in 2024.   -states she is not taking any multivitamins    Epistaxis.   -Patient has not had any epistaxis since her last visit.  -pt did try antihistamine and placed a humidifier in her room which seemed to help a little bit.   -normal PT/ INR, PTT, and platelet count. No anemia on recent cbc     Fatigue.   -Patient complains of fatigue which she attributes it to working 14-hour shifts. Works at [employer].   -She has not followed sleep medicine and reports needing to call them back for an appointment.   -labs collected last month for eval were notable for low TSAT (9%) and neutropenia (WBC- 3.12, ANC- 1.4)    Ear discomfort.   -reports being dx with (Right AOM) ear infection ~1/15/24 and was treated with amoxicillin. Reports completing entire course of abx prescribed   -She continues to have ear discomfort and states that it feels like ears are draining   -denies any other upper respiratory sxs or fever       Subjective      Review of Systems:   Review of Systems   Constitutional:  Positive for fatigue.   HENT:  Negative for nosebleeds.    Respiratory: Negative.     Cardiovascular: Negative.    Endocrine: Negative.    Hematological:  Negative for adenopathy. Does not bruise/bleed easily.       I have reviewed the patients family history, social history, past medical history, past surgical history and have updated it as appropriate.  "    Medications:     Current Outpatient Medications:     naproxen (NAPROSYN) 500 MG tablet, Take 1 tablet by mouth Every 12 (Twelve) Hours As Needed for Moderate Pain., Disp: 180 tablet, Rfl: 0    Xulane 150-35 MCG/24HR, APPLY 1 PATCH EACH WEEK FOR 3 WEEKS (21 TOTAL DAYS), FOLLOWED BY ONE WEEK PATCH-FREE, Disp: 3 patch, Rfl: 3    linaclotide (Linzess) 290 MCG capsule capsule, Take 1 capsule by mouth Every Morning Before Breakfast. (Patient not taking: Reported on 2/7/2024), Disp: 90 capsule, Rfl: 3    Allergies:   No Known Allergies    Objective     Physical Exam: Please see above  Vital Signs:   Vitals:    02/07/24 1339   BP: 102/68   Pulse: 90   Resp: 16   SpO2: 98%   Weight: 59.9 kg (132 lb)   Height: 167.6 cm (66\")   PainSc: 0-No pain     Body mass index is 21.31 kg/m².    Physical Exam  Vitals and nursing note reviewed.   Constitutional:       General: She is not in acute distress.     Appearance: Normal appearance. She is normal weight. She is not ill-appearing or diaphoretic.   HENT:      Head: Normocephalic and atraumatic.      Right Ear: Tympanic membrane, ear canal and external ear normal.      Left Ear: Tympanic membrane, ear canal and external ear normal.      Nose: Nose normal. No congestion or rhinorrhea.      Mouth/Throat:      Mouth: Mucous membranes are moist.      Pharynx: Oropharynx is clear. No oropharyngeal exudate or posterior oropharyngeal erythema.   Eyes:      Conjunctiva/sclera: Conjunctivae normal.   Cardiovascular:      Rate and Rhythm: Normal rate and regular rhythm.   Pulmonary:      Effort: Pulmonary effort is normal. No respiratory distress.      Breath sounds: Normal breath sounds. No wheezing.   Musculoskeletal:      Cervical back: Neck supple.   Lymphadenopathy:      Head:      Right side of head: No submental, submandibular, tonsillar, preauricular, posterior auricular or occipital adenopathy.      Left side of head: No submental, submandibular, tonsillar, preauricular, posterior " auricular or occipital adenopathy.      Cervical: No cervical adenopathy.      Upper Body:      Right upper body: No supraclavicular or axillary adenopathy.      Left upper body: No supraclavicular or axillary adenopathy.   Skin:     General: Skin is warm and dry.      Coloration: Skin is not pale.   Neurological:      Mental Status: She is alert and oriented to person, place, and time.   Psychiatric:         Mood and Affect: Mood normal.         Behavior: Behavior normal.         Thought Content: Thought content normal.         Judgment: Judgment normal.             Results:   Imaging:     Labs:       Latest Reference Range & Units 01/11/24 12:49   Sodium 136 - 145 mmol/L 138   Potassium 3.5 - 5.2 mmol/L 4.6   Chloride 98 - 107 mmol/L 104   CO2 22.0 - 29.0 mmol/L 23.4   Anion Gap 5.0 - 15.0 mmol/L 10.6   BUN 6 - 20 mg/dL 11   Creatinine 0.57 - 1.00 mg/dL 0.63   BUN/Creatinine Ratio 7.0 - 25.0  17.5   eGFR >60.0 mL/min/1.73 130.4   Glucose 65 - 99 mg/dL 71   Calcium 8.6 - 10.5 mg/dL 9.2   Alkaline Phosphatase 39 - 117 U/L 73   Total Protein 6.0 - 8.5 g/dL 7.4   Albumin 3.5 - 5.2 g/dL 4.2   Globulin gm/dL 3.2   A/G Ratio g/dL 1.3   AST (SGOT) 1 - 32 U/L 22   ALT (SGPT) 1 - 33 U/L 13   Total Bilirubin 0.0 - 1.2 mg/dL <0.2   Hemoglobin A1C 4.80 - 5.60 % 5.30   TSH Baseline 0.270 - 4.200 uIU/mL 1.290   Iron 37 - 145 mcg/dL 45   Ferritin 13.00 - 150.00 ng/mL 44.70   Iron Saturation (TSAT) 20 - 50 % 9 (L)   Transferrin 200 - 360 mg/dL 334   TIBC 298 - 536 mcg/dL 498   Vitamin B-12 211 - 946 pg/mL 259   Folate 4.78 - 24.20 ng/mL 15.20   Total Cholesterol 0 - 200 mg/dL 142   HDL Cholesterol 40 - 60 mg/dL 69 (H)   LDL Cholesterol  0 - 100 mg/dL 46   VLDL Cholesterol 5 - 40 mg/dL 27   Triglycerides 0 - 150 mg/dL 164 (H)   LDL/HDL Ratio  0.58   25 Hydroxy, Vitamin D 30.0 - 100.0 ng/ml 33.0   Protime 12.2 - 14.5 Seconds 12.3   INR 0.89 - 1.12  0.90   PTT 22.0 - 39.0 seconds 28.1   WBC 3.40 - 10.80 10*3/mm3 3.12 (L)   RBC 3.77 -  5.28 10*6/mm3 5.08   Hemoglobin 12.0 - 15.9 g/dL 14.3   Hematocrit 34.0 - 46.6 % 43.5   Platelets 140 - 450 10*3/mm3 178   RDW 12.3 - 15.4 % 12.5   MCV 79.0 - 97.0 fL 85.6   MCH 26.6 - 33.0 pg 28.1   MCHC 31.5 - 35.7 g/dL 32.9   MPV 6.0 - 12.0 fL 10.6   RDW-SD 37.0 - 54.0 fl 38.6   Neutrophil Rel % 42.7 - 76.0 % 44.9   Lymphocyte Rel % 19.6 - 45.3 % 39.7   Monocyte Rel % 5.0 - 12.0 % 11.9   Eosinophil Rel % 0.3 - 6.2 % 2.6   Basophil Rel % 0.0 - 1.5 % 0.6   Immature Granulocyte Rel % 0.0 - 0.5 % 0.3   Neutrophils Absolute 1.70 - 7.00 10*3/mm3 1.40 (L)   Lymphocytes Absolute 0.70 - 3.10 10*3/mm3 1.24   Monocytes Absolute 0.10 - 0.90 10*3/mm3 0.37   Eosinophils Absolute 0.00 - 0.40 10*3/mm3 0.08   Basophils Absolute 0.00 - 0.20 10*3/mm3 0.02   Immature Grans, Absolute 0.00 - 0.05 10*3/mm3 0.01   nRBC 0.0 - 0.2 /100 WBC 0.0   (L): Data is abnormally low  (H): Data is abnormally high    Assessment / Plan      Assessment/Plan:   Diagnoses and all orders for this visit:    1. Neutropenia, unspecified type (Primary)  -repeat cbc today, will need further w/u for persistent abnormal blood counts   -     CBC w AUTO Differential; Future    2. Iron deficiency  Recommended possibly a short course of OTC iron supplement. Discussed iron benefits and side effects with the patient. Further rec after review of repeat labs today (cbc, iron profile)  -     Iron Profile; Future    3. Low serum vitamin D  -vitamin D low end of normal range. Recommend OTC D3 qd    4. Low vitamin B12 level  -B12 level low end of normal range. Folate WNL. Recommend repeat lab check in 6-12m    5. Mixed hyperlipidemia  -Offered nutritional referral. Discussed low fat dietary recommendations and cholesterol friendly diet encouraged. Also need routine exercise as tolerated.   -repeat FLP in 1 yr     6. Nonintractable headache, unspecified chronicity pattern, unspecified headache type  7. Other fatigue  8. Epistaxis  -nosebleeds resolved, continue to  monitor  -proceed with sleep med referral (HA, nosebleeds, fatigue)  -continue antihistamine, nasal steroid spray, humidifier qhs, and nasal saline spray or gel PRN for nosebleed prevention   -encouraged implementation of healthy diet and regular exercise to combat fatigue along with sleep hygiene     9. Discomfort of right ear  10. Otalgia, unspecified laterality  -no si/sx of inner or outter ear infection today on exam  -continue po antihistamine and inhaled nasal steroid daily (demonstrated proper use) as sxs could be secondary to allergic rhinitis/ eustachian tube dysfunction     Laboratory results dated 01/11/2024 were personally reviewed and discussed with the patient during visit. She was instructed to stop by lab for repeat blood work as above on her way out door today.       Follow Up:   Return in about 6 months (around 8/7/2024) for Recheck, est with new PCP.    ESME Lynch  [facility] [place]     Transcribed from ambient dictation for ESME Moss by Rashid Whittington.  02/07/24   14:49 EST    Patient or patient representative verbalized consent to the visit recording.  I have personally performed the services described in this document as transcribed by the above individual, and it is both accurate and complete.

## 2024-02-08 LAB
BASOPHILS # BLD AUTO: 0.02 10*3/MM3 (ref 0–0.2)
BASOPHILS NFR BLD AUTO: 0.3 % (ref 0–1.5)
DEPRECATED RDW RBC AUTO: 41.4 FL (ref 37–54)
EOSINOPHIL # BLD AUTO: 0.09 10*3/MM3 (ref 0–0.4)
EOSINOPHIL NFR BLD AUTO: 1.4 % (ref 0.3–6.2)
ERYTHROCYTE [DISTWIDTH] IN BLOOD BY AUTOMATED COUNT: 13.1 % (ref 12.3–15.4)
HCT VFR BLD AUTO: 43.2 % (ref 34–46.6)
HGB BLD-MCNC: 14.4 G/DL (ref 12–15.9)
IMM GRANULOCYTES # BLD AUTO: 0.02 10*3/MM3 (ref 0–0.05)
IMM GRANULOCYTES NFR BLD AUTO: 0.3 % (ref 0–0.5)
LYMPHOCYTES # BLD AUTO: 2.2 10*3/MM3 (ref 0.7–3.1)
LYMPHOCYTES NFR BLD AUTO: 34 % (ref 19.6–45.3)
MCH RBC QN AUTO: 29.1 PG (ref 26.6–33)
MCHC RBC AUTO-ENTMCNC: 33.3 G/DL (ref 31.5–35.7)
MCV RBC AUTO: 87.3 FL (ref 79–97)
MONOCYTES # BLD AUTO: 0.38 10*3/MM3 (ref 0.1–0.9)
MONOCYTES NFR BLD AUTO: 5.9 % (ref 5–12)
NEUTROPHILS NFR BLD AUTO: 3.77 10*3/MM3 (ref 1.7–7)
NEUTROPHILS NFR BLD AUTO: 58.1 % (ref 42.7–76)
NRBC BLD AUTO-RTO: 0 /100 WBC (ref 0–0.2)
PLATELET # BLD AUTO: 179 10*3/MM3 (ref 140–450)
PMV BLD AUTO: 10.8 FL (ref 6–12)
RBC # BLD AUTO: 4.95 10*6/MM3 (ref 3.77–5.28)
WBC NRBC COR # BLD AUTO: 6.48 10*3/MM3 (ref 3.4–10.8)

## 2024-02-12 ENCOUNTER — TELEPHONE (OUTPATIENT)
Dept: INTERNAL MEDICINE | Facility: CLINIC | Age: 19
End: 2024-02-12
Payer: COMMERCIAL

## 2024-03-03 DIAGNOSIS — Z30.8 ENCOUNTER FOR OTHER CONTRACEPTIVE MANAGEMENT: ICD-10-CM

## 2024-03-04 RX ORDER — NORELGESTROMIN AND ETHINYL ESTRADIOL 150; 35 UG/D; UG/D
PATCH TRANSDERMAL
Qty: 3 PATCH | Refills: 3 | OUTPATIENT
Start: 2024-03-04

## 2024-04-25 DIAGNOSIS — Z30.8 ENCOUNTER FOR OTHER CONTRACEPTIVE MANAGEMENT: ICD-10-CM

## 2024-04-25 RX ORDER — NORELGESTROMIN AND ETHINYL ESTRADIOL 150; 35 UG/D; UG/D
PATCH TRANSDERMAL
Qty: 3 PATCH | Refills: 3 | OUTPATIENT
Start: 2024-04-25

## 2024-04-30 DIAGNOSIS — Z30.8 ENCOUNTER FOR OTHER CONTRACEPTIVE MANAGEMENT: ICD-10-CM

## 2024-05-01 RX ORDER — NORELGESTROMIN AND ETHINYL ESTRADIOL 150; 35 UG/D; UG/D
PATCH TRANSDERMAL
Qty: 3 PATCH | Refills: 3 | OUTPATIENT
Start: 2024-05-01

## 2024-07-19 ENCOUNTER — HOSPITAL ENCOUNTER (EMERGENCY)
Age: 19
Discharge: HOME | End: 2024-07-19
Payer: COMMERCIAL

## 2024-07-19 VITALS — HEART RATE: 112 BPM | OXYGEN SATURATION: 96 %

## 2024-07-19 VITALS
TEMPERATURE: 98.24 F | SYSTOLIC BLOOD PRESSURE: 134 MMHG | HEART RATE: 116 BPM | DIASTOLIC BLOOD PRESSURE: 82 MMHG | RESPIRATION RATE: 16 BRPM | OXYGEN SATURATION: 98 %

## 2024-07-19 VITALS — OXYGEN SATURATION: 99 % | HEART RATE: 99 BPM | SYSTOLIC BLOOD PRESSURE: 98 MMHG | DIASTOLIC BLOOD PRESSURE: 56 MMHG

## 2024-07-19 VITALS
HEART RATE: 104 BPM | TEMPERATURE: 98.06 F | SYSTOLIC BLOOD PRESSURE: 117 MMHG | RESPIRATION RATE: 16 BRPM | DIASTOLIC BLOOD PRESSURE: 92 MMHG

## 2024-07-19 VITALS — OXYGEN SATURATION: 96 % | DIASTOLIC BLOOD PRESSURE: 84 MMHG | SYSTOLIC BLOOD PRESSURE: 122 MMHG

## 2024-07-19 VITALS — BODY MASS INDEX: 21.7 KG/M2

## 2024-07-19 DIAGNOSIS — K59.04: ICD-10-CM

## 2024-07-19 DIAGNOSIS — R10.13: Primary | ICD-10-CM

## 2024-07-19 LAB
ALBUMIN LEVEL: 4.3 G/DL (ref 3.5–5)
ALBUMIN/GLOB SERPL: 1.3 {RATIO} (ref 1.1–1.8)
ALP ISO SERPL-ACNC: 77 U/L (ref 38–126)
ALT SERPLBLD-CCNC: 52 U/L (ref 12–78)
ANION GAP SERPL CALC-SCNC: 12 MEQ/L (ref 5–15)
AST SERPL QL: 41 U/L (ref 14–36)
BILIRUBIN,TOTAL: 0.7 MG/DL (ref 0.2–1.3)
BUN SERPL-MCNC: 18 MG/DL (ref 7–17)
CALCIUM SPEC-MCNC: 9.5 MG/DL (ref 8.4–10.2)
CELLS COUNTED: 100
CHLORIDE SPEC-SCNC: 102 MMOL/L (ref 98–107)
CO2 SERPL-SCNC: 26 MMOL/L (ref 22–30)
CREAT BLD-SCNC: 0.5 MG/DL (ref 0.52–1.04)
CREATININE CLEARANCE ESTIMATED: 172 ML/MIN (ref 50–200)
ESTIMATED GLOMERULAR FILT RATE: 156 ML/MIN (ref 60–?)
GFR (AFRICAN AMERICAN): 188 ML/MIN (ref 60–?)
GLOBULIN SER CALC-MCNC: 3.3 G/DL (ref 1.3–3.2)
GLUCOSE: 96 MG/DL (ref 74–100)
HCT VFR BLD CALC: 43 % (ref 37–47)
HGB BLD-MCNC: 14.6 G/DL (ref 12.2–16.2)
LIPASE: 64 U/L (ref 23–300)
MANUAL DIFFERENTIAL: (no result)
MCHC RBC-ENTMCNC: 34 G/DL (ref 31.8–35.4)
MCV RBC: 89.5 FL (ref 81–99)
MEAN CORPUSCULAR HEMOGLOBIN: 30.4 PG (ref 27–31.2)
PLATELET # BLD: 167 K/MM3 (ref 142–424)
POTASSIUM: 4 MMOL/L (ref 3.5–5.1)
PROT SERPL-MCNC: 7.6 G/DL (ref 6.3–8.2)
RBC # BLD AUTO: 4.8 M/MM3 (ref 4.2–5.4)
SODIUM SPEC-SCNC: 136 MMOL/L (ref 136–145)
STOMATOCYTES BLD QL SMEAR: (no result)
WBC # BLD AUTO: 10.8 K/MM3 (ref 4.8–10.8)

## 2024-07-19 PROCEDURE — 25810000003 SODIUM CHLORIDE 0.9 % SOLUTION: Performed by: EMERGENCY MEDICINE

## 2024-07-19 PROCEDURE — 83690 ASSAY OF LIPASE: CPT | Performed by: EMERGENCY MEDICINE

## 2024-07-19 PROCEDURE — 80053 COMPREHEN METABOLIC PANEL: CPT | Performed by: EMERGENCY MEDICINE

## 2024-07-19 PROCEDURE — 84703 CHORIONIC GONADOTROPIN ASSAY: CPT

## 2024-07-19 PROCEDURE — 81025 URINE PREGNANCY TEST: CPT | Performed by: EMERGENCY MEDICINE

## 2024-07-19 PROCEDURE — 85027 COMPLETE CBC AUTOMATED: CPT

## 2024-07-19 PROCEDURE — 83690 ASSAY OF LIPASE: CPT

## 2024-07-19 PROCEDURE — 85007 BL SMEAR W/DIFF WBC COUNT: CPT

## 2024-07-19 PROCEDURE — 25010000002 ONDANSETRON PER 1 MG: Performed by: EMERGENCY MEDICINE

## 2024-07-19 PROCEDURE — 96374 THER/PROPH/DIAG INJ IV PUSH: CPT

## 2024-07-19 PROCEDURE — 96361 HYDRATE IV INFUSION ADD-ON: CPT

## 2024-07-19 PROCEDURE — 85025 COMPLETE CBC W/AUTO DIFF WBC: CPT

## 2024-07-19 PROCEDURE — 80053 COMPREHEN METABOLIC PANEL: CPT

## 2024-07-19 PROCEDURE — 85025 COMPLETE CBC W/AUTO DIFF WBC: CPT | Performed by: EMERGENCY MEDICINE

## 2024-07-19 PROCEDURE — 25010000002 KETOROLAC TROMETHAMINE PER 15 MG: Performed by: EMERGENCY MEDICINE

## 2024-07-19 PROCEDURE — 99284 EMERGENCY DEPT VISIT MOD MDM: CPT

## 2024-07-19 PROCEDURE — 96375 TX/PRO/DX INJ NEW DRUG ADDON: CPT

## 2024-07-19 PROCEDURE — 74018 RADEX ABDOMEN 1 VIEW: CPT

## 2024-07-19 PROCEDURE — S0028 INJECTION, FAMOTIDINE, 20 MG: HCPCS

## 2024-07-19 PROCEDURE — 99285 EMERGENCY DEPT VISIT HI MDM: CPT

## 2024-07-19 RX ORDER — KETOROLAC TROMETHAMINE 30 MG/ML
30 INJECTION, SOLUTION INTRAMUSCULAR; INTRAVENOUS ONCE
Status: COMPLETED | OUTPATIENT
Start: 2024-07-19 | End: 2024-07-19

## 2024-07-19 RX ORDER — SODIUM CHLORIDE 0.9 % (FLUSH) 0.9 %
10 SYRINGE (ML) INJECTION AS NEEDED
Status: DISCONTINUED | OUTPATIENT
Start: 2024-07-19 | End: 2024-07-20 | Stop reason: HOSPADM

## 2024-07-19 RX ORDER — ONDANSETRON 2 MG/ML
4 INJECTION INTRAMUSCULAR; INTRAVENOUS ONCE
Status: COMPLETED | OUTPATIENT
Start: 2024-07-19 | End: 2024-07-19

## 2024-07-19 RX ADMIN — ONDANSETRON 4 MG: 2 INJECTION INTRAMUSCULAR; INTRAVENOUS at 23:54

## 2024-07-19 RX ADMIN — KETOROLAC TROMETHAMINE 30 MG: 30 INJECTION, SOLUTION INTRAMUSCULAR; INTRAVENOUS at 23:54

## 2024-07-19 RX ADMIN — SODIUM CHLORIDE 1000 ML: 9 INJECTION, SOLUTION INTRAVENOUS at 23:54

## 2024-07-20 ENCOUNTER — APPOINTMENT (OUTPATIENT)
Dept: CT IMAGING | Facility: HOSPITAL | Age: 19
End: 2024-07-20
Payer: COMMERCIAL

## 2024-07-20 ENCOUNTER — HOSPITAL ENCOUNTER (EMERGENCY)
Facility: HOSPITAL | Age: 19
Discharge: HOME | End: 2024-07-20
Attending: EMERGENCY MEDICINE
Payer: COMMERCIAL

## 2024-07-20 VITALS
HEART RATE: 87 BPM | WEIGHT: 135 LBS | TEMPERATURE: 99.3 F | DIASTOLIC BLOOD PRESSURE: 56 MMHG | RESPIRATION RATE: 18 BRPM | HEIGHT: 66 IN | BODY MASS INDEX: 21.69 KG/M2 | SYSTOLIC BLOOD PRESSURE: 99 MMHG | OXYGEN SATURATION: 92 %

## 2024-07-20 DIAGNOSIS — N83.201 RIGHT OVARIAN CYST: Primary | ICD-10-CM

## 2024-07-20 LAB
ALBUMIN SERPL-MCNC: 4.2 G/DL (ref 3.5–5.2)
ALBUMIN/GLOB SERPL: 1.3 G/DL
ALP SERPL-CCNC: 70 U/L (ref 39–117)
ALT SERPL W P-5'-P-CCNC: 39 U/L (ref 1–33)
ANION GAP SERPL CALCULATED.3IONS-SCNC: 13 MMOL/L (ref 5–15)
AST SERPL-CCNC: 26 U/L (ref 1–32)
B-HCG UR QL: NEGATIVE
BASOPHILS # BLD AUTO: 0.01 10*3/MM3 (ref 0–0.2)
BASOPHILS NFR BLD AUTO: 0.1 % (ref 0–1.5)
BILIRUB SERPL-MCNC: 0.9 MG/DL (ref 0–1.2)
BILIRUB UR QL STRIP: NEGATIVE
BUN SERPL-MCNC: 13 MG/DL (ref 6–20)
BUN/CREAT SERPL: 21.3 (ref 7–25)
CALCIUM SPEC-SCNC: 8.7 MG/DL (ref 8.6–10.5)
CHLORIDE SERPL-SCNC: 99 MMOL/L (ref 98–107)
CLARITY UR: CLEAR
CO2 SERPL-SCNC: 22 MMOL/L (ref 22–29)
COLOR UR: YELLOW
CREAT SERPL-MCNC: 0.61 MG/DL (ref 0.57–1)
DEPRECATED RDW RBC AUTO: 37.2 FL (ref 37–54)
EGFRCR SERPLBLD CKD-EPI 2021: 130.6 ML/MIN/1.73
EOSINOPHIL # BLD AUTO: 0.01 10*3/MM3 (ref 0–0.4)
EOSINOPHIL NFR BLD AUTO: 0.1 % (ref 0.3–6.2)
ERYTHROCYTE [DISTWIDTH] IN BLOOD BY AUTOMATED COUNT: 12 % (ref 12.3–15.4)
EXPIRATION DATE: NORMAL
GLOBULIN UR ELPH-MCNC: 3.2 GM/DL
GLUCOSE SERPL-MCNC: 112 MG/DL (ref 65–99)
GLUCOSE UR STRIP-MCNC: NEGATIVE MG/DL
HCT VFR BLD AUTO: 41.1 % (ref 34–46.6)
HGB BLD-MCNC: 14.2 G/DL (ref 12–15.9)
HGB UR QL STRIP.AUTO: NEGATIVE
IMM GRANULOCYTES # BLD AUTO: 0.03 10*3/MM3 (ref 0–0.05)
IMM GRANULOCYTES NFR BLD AUTO: 0.3 % (ref 0–0.5)
INTERNAL NEGATIVE CONTROL: NORMAL
INTERNAL POSITIVE CONTROL: NORMAL
KETONES UR QL STRIP: ABNORMAL
LEUKOCYTE ESTERASE UR QL STRIP.AUTO: NEGATIVE
LIPASE SERPL-CCNC: 17 U/L (ref 13–60)
LYMPHOCYTES # BLD AUTO: 0.58 10*3/MM3 (ref 0.7–3.1)
LYMPHOCYTES NFR BLD AUTO: 5.4 % (ref 19.6–45.3)
Lab: NORMAL
MCH RBC QN AUTO: 29.5 PG (ref 26.6–33)
MCHC RBC AUTO-ENTMCNC: 34.5 G/DL (ref 31.5–35.7)
MCV RBC AUTO: 85.3 FL (ref 79–97)
MONOCYTES # BLD AUTO: 0.44 10*3/MM3 (ref 0.1–0.9)
MONOCYTES NFR BLD AUTO: 4.1 % (ref 5–12)
NEUTROPHILS NFR BLD AUTO: 9.71 10*3/MM3 (ref 1.7–7)
NEUTROPHILS NFR BLD AUTO: 90 % (ref 42.7–76)
NITRITE UR QL STRIP: NEGATIVE
NRBC BLD AUTO-RTO: 0 /100 WBC (ref 0–0.2)
PH UR STRIP.AUTO: 6 [PH] (ref 5–8)
PLATELET # BLD AUTO: 172 10*3/MM3 (ref 140–450)
PMV BLD AUTO: 10.4 FL (ref 6–12)
POTASSIUM SERPL-SCNC: 3.6 MMOL/L (ref 3.5–5.2)
PROT SERPL-MCNC: 7.4 G/DL (ref 6–8.5)
PROT UR QL STRIP: NEGATIVE
RBC # BLD AUTO: 4.82 10*6/MM3 (ref 3.77–5.28)
SODIUM SERPL-SCNC: 134 MMOL/L (ref 136–145)
SP GR UR STRIP: 1.02 (ref 1–1.03)
UROBILINOGEN UR QL STRIP: ABNORMAL
WBC NRBC COR # BLD AUTO: 10.78 10*3/MM3 (ref 3.4–10.8)

## 2024-07-20 PROCEDURE — 81003 URINALYSIS AUTO W/O SCOPE: CPT | Performed by: EMERGENCY MEDICINE

## 2024-07-20 PROCEDURE — 87086 URINE CULTURE/COLONY COUNT: CPT | Performed by: EMERGENCY MEDICINE

## 2024-07-20 PROCEDURE — 25510000001 IOPAMIDOL 61 % SOLUTION: Performed by: EMERGENCY MEDICINE

## 2024-07-20 PROCEDURE — 74177 CT ABD & PELVIS W/CONTRAST: CPT

## 2024-07-20 RX ORDER — METHOCARBAMOL 750 MG/1
750 TABLET, FILM COATED ORAL 3 TIMES DAILY
Qty: 15 TABLET | Refills: 0 | Status: SHIPPED | OUTPATIENT
Start: 2024-07-20 | End: 2024-07-25

## 2024-07-20 RX ADMIN — IOPAMIDOL 100 ML: 612 INJECTION, SOLUTION INTRAVENOUS at 01:44

## 2024-07-20 NOTE — ED PROVIDER NOTES
Subjective   History of Present Illness  This is a 19-year-old female with a history of anxiety presented to the emergency department with some abdominal pain, nausea vomiting diarrhea.  Patient is had the symptoms for the last 24 hours.  Patient states that she has been having some cramping throughout her abdomen.  She has had multiple loose stools.  She is vomiting throughout the day.  Patient also endorses some low-grade fevers.  She is not having any vaginal discharge or bleeding.  Denies any hematuria or dysuria.  She is not having any chest pain or shortness of breath.  No headache or change in vision.  No focal weakness    History provided by:  Patient   used: No        Review of Systems   Constitutional:  Positive for fever. Negative for chills.   HENT:  Negative for congestion, ear pain and sore throat.    Eyes:  Negative for visual disturbance.   Respiratory:  Negative for shortness of breath.    Cardiovascular:  Negative for chest pain.   Gastrointestinal:  Positive for abdominal pain, diarrhea, nausea and vomiting.   Genitourinary:  Negative for difficulty urinating.   Musculoskeletal:  Negative for arthralgias.   Skin:  Negative for rash.   Neurological:  Negative for dizziness, weakness and numbness.   Psychiatric/Behavioral:  Negative for agitation.        Past Medical History:   Diagnosis Date    Anxiety     Depression     Headache        No Known Allergies    Past Surgical History:   Procedure Laterality Date    APPENDECTOMY      WISDOM TOOTH EXTRACTION         Family History   Problem Relation Age of Onset    Hypertension Mother     Hyperlipidemia Mother     Mental illness Mother     Anxiety disorder Mother     Depression Mother     COPD Mother     Hypertension Father     Hyperlipidemia Father     Colon cancer Neg Hx     Colon polyps Neg Hx     Esophageal cancer Neg Hx        Social History     Socioeconomic History    Marital status: Single   Tobacco Use    Smoking status:  Never    Smokeless tobacco: Never   Vaping Use    Vaping status: Never Used   Substance and Sexual Activity    Alcohol use: Yes     Comment: Occasionally    Drug use: Never    Sexual activity: Yes     Partners: Male     Birth control/protection: Patch           Objective   Physical Exam  Vitals and nursing note reviewed.   Constitutional:       General: She is not in acute distress.     Appearance: She is not ill-appearing or toxic-appearing.   HENT:      Mouth/Throat:      Pharynx: No posterior oropharyngeal erythema.   Eyes:      Conjunctiva/sclera: Conjunctivae normal.      Pupils: Pupils are equal, round, and reactive to light.   Cardiovascular:      Rate and Rhythm: Regular rhythm. Tachycardia present.   Pulmonary:      Effort: Pulmonary effort is normal. No respiratory distress.   Abdominal:      General: Abdomen is flat. There is no distension.      Palpations: There is no mass.      Tenderness: There is generalized abdominal tenderness. There is no guarding or rebound.   Musculoskeletal:         General: No deformity. Normal range of motion.   Skin:     General: Skin is warm.      Findings: No rash.   Neurological:      General: No focal deficit present.      Mental Status: She is alert and oriented to person, place, and time.      Motor: No weakness.         Procedures           ED Course  ED Course as of 07/20/24 0323 Fri Jul 19, 2024 2319 BP: 112/66 [JK]   2319 Temp: 99.3 °F (37.4 °C) [JK]   2319 Heart Rate: 116 [JK]   2319 Resp: 18 [JK]   2319 SpO2: 93 %  Interpretation:  Patient's repeat vitals, telemetry tracing, and pulse oximetry tracing were directly viewed and interpreted by myself.  Sinus tachycardia [JK]   Sat Jul 20, 2024   0320 Comprehensive Metabolic Panel(!) [JK]   0320 Lipase [JK]   0320 Urinalysis With Microscopic If Indicated (No Culture) - Urine, Clean Catch(!) [JK]   0320 POC Urine Pregnancy [JK]   0320 CBC & Differential(!)  Interpretation:  Laboratory studies were reviewed and  interpreted directly by myself.  CMP was unremarkable, lipase normal, urinalysis is unremarkable, pregnancy normal, CBC normal [JK]   0320 CT Abdomen Pelvis With Contrast  Interpretation:  Imaging was directly visualized by myself, per my interpretations, CT abdomen pelvis showed a right ovarian cyst. [JK]   0320 Patient is finding consistent with an ovarian cyst. [JK]   0321 On reevaluation, the patient states that they are feeling much better.  Patient tolerated by mouth challenge of juice and crackers without difficulty.  They are not complaining of any new abdominal pain.  Repeat examination did not show any significant guarding or rebound.  No evidence of peritonitis.  No acute no tenderness.  Patient was given strict return precautions for acute abdomen.  They need to be reevaluated within 24 hours for acute abdomen by PCP or return to the emergency department for reexamination.   [JK]   0321 I had a discussion with the patient/family regarding diagnosis, diagnostic results, treatment plan, and medications. The patient/family indicated understanding of these instructions. I spent adequate time at the bedside prior to discharge necessary to discuss the aftercare instructions, giving patient education, providing explanations of the results of our evaluations/findings, and my decision making to assure that the patient/family understand the plan of care. Time was allotted to answer questions at that time and throughout the ED course. Patient is required to maintain timely follow up, as discussed. I also discussed the potential for the development of an acute emergent condition requiring further evaluation, return to the ER, admission, or even surgical intervention.  I encouraged the patient to return to the emergency department immediately for any concerns, worsening symptoms, new complaints, or if symptoms persist and they are unable to seek follow-up in a timely fashion. The patient/family expressed  understanding and agreement with this plan    Shared decision making:   After full review of the patient's clinical presentation, review of any work-up including but not limited to laboratory studies and radiology obtained, I had a discussion with the patient.  Treatment options were discussed as well as the risks, benefits and consequences.  I discussed all findings with the patient and family members if available.  During the discussion, treatment goals were understood by all as well as any misconceptions which were addressed with the patient.  Ample time was given for any questions they may have had.  They are in agreement with the treatment plan as well as final disposition. [JK]      ED Course User Index  [JK] Issac Boyd MD                                             Medical Decision Making  This is a 19-year-old female presenting to the emergency department with some nausea, vomiting, diarrhea and fever.  Patient symptoms seem consistent with an acute gastroenteritis.  On exam she has no evidence of acute abdomen or peritonitis.  Overall, the patient is nontoxic.  Afebrile.  IV access was established and the patient.  Placed on continuous telemetry monitoring.  Given the patient's presentation, differential is broad and will require further evaluation.  Workup initiated.      Differential diagnosis: Peptic ulcer disease, dyspepsia, GERD, pancreatitis, biliary colic, electrolyte abnormality, acute kidney injury, gastroenteritis      Amount and/or Complexity of Data Reviewed  External Data Reviewed: labs and notes.     Details: External laboratories, imaging as well as notes were reviewed personally by myself.  All relevant studies were used to guide decision making.     Date of previous record: 5/14/2024    Source of note: Primary physician    Summary: Patient was seen evaluate for routine visit.  Did review basic laboratory studies on file as well as previous records.  Records reviewed    Labs:  ordered. Decision-making details documented in ED Course.  Radiology: ordered and independent interpretation performed. Decision-making details documented in ED Course.    Risk  Prescription drug management.        Final diagnoses:   Right ovarian cyst       ED Disposition  ED Disposition       ED Disposition   Discharge    Condition   Stable    Comment   --               Columba Zaldivar MD  [address] [id number] [phone]    Call in 1 day           Medication List        New Prescriptions      methocarbamol 750 MG tablet  Commonly known as: ROBAXIN  Take 1 tablet by mouth 3 (Three) Times a Day for 5 days.               Where to Get Your Medications        These medications were sent to [facility]/pharm[facility] #[id number] - [place], [place] - [facility] [address] AT NEXT TO [facility] - [phone]  - [phone] [address] [address]      Phone: [phone]   methocarbamol 750 MG tablet            Issac Boyd MD  07/20/24 0764

## 2024-07-21 LAB — BACTERIA SPEC AEROBE CULT: NORMAL
